# Patient Record
Sex: FEMALE | Race: WHITE | NOT HISPANIC OR LATINO | URBAN - METROPOLITAN AREA
[De-identification: names, ages, dates, MRNs, and addresses within clinical notes are randomized per-mention and may not be internally consistent; named-entity substitution may affect disease eponyms.]

---

## 2021-01-15 ENCOUNTER — IMMUNIZATION (OUTPATIENT)
Dept: INTERNAL MEDICINE | Facility: CLINIC | Age: 76
End: 2021-01-15
Payer: MEDICARE

## 2021-01-15 DIAGNOSIS — Z23 NEED FOR VACCINATION: Primary | ICD-10-CM

## 2021-01-15 PROCEDURE — 91300 COVID-19, MRNA, LNP-S, PF, 30 MCG/0.3 ML DOSE VACCINE: CPT | Mod: PBBFAC | Performed by: INTERNAL MEDICINE

## 2021-02-05 ENCOUNTER — IMMUNIZATION (OUTPATIENT)
Dept: INTERNAL MEDICINE | Facility: CLINIC | Age: 76
End: 2021-02-05
Payer: MEDICARE

## 2021-02-05 DIAGNOSIS — Z23 NEED FOR VACCINATION: Primary | ICD-10-CM

## 2021-02-05 PROCEDURE — 0002A COVID-19, MRNA, LNP-S, PF, 30 MCG/0.3 ML DOSE VACCINE: CPT | Mod: PBBFAC | Performed by: INTERNAL MEDICINE

## 2021-02-05 PROCEDURE — 91300 COVID-19, MRNA, LNP-S, PF, 30 MCG/0.3 ML DOSE VACCINE: CPT | Mod: PBBFAC | Performed by: INTERNAL MEDICINE

## 2021-03-18 ENCOUNTER — PATIENT MESSAGE (OUTPATIENT)
Dept: RESEARCH | Facility: HOSPITAL | Age: 76
End: 2021-03-18

## 2021-03-26 ENCOUNTER — PATIENT MESSAGE (OUTPATIENT)
Dept: RESEARCH | Facility: HOSPITAL | Age: 76
End: 2021-03-26

## 2024-07-24 ENCOUNTER — HOSPITAL ENCOUNTER (INPATIENT)
Facility: OTHER | Age: 79
LOS: 1 days | Discharge: HOME | DRG: 057 | End: 2024-07-26
Attending: EMERGENCY MEDICINE | Admitting: HOSPITALIST
Payer: COMMERCIAL

## 2024-07-24 DIAGNOSIS — G20.A1 PARKINSON'S DISEASE, UNSPECIFIED WHETHER DYSKINESIA PRESENT, UNSPECIFIED WHETHER MANIFESTATIONS FLUCTUATE: ICD-10-CM

## 2024-07-24 DIAGNOSIS — E86.0 DEHYDRATION: ICD-10-CM

## 2024-07-24 DIAGNOSIS — R00.0 TACHYCARDIA: ICD-10-CM

## 2024-07-24 DIAGNOSIS — Z91.81 HISTORY OF RECENT FALL: ICD-10-CM

## 2024-07-24 DIAGNOSIS — R41.82 ALTERED MENTAL STATUS, UNSPECIFIED ALTERED MENTAL STATUS TYPE: Primary | ICD-10-CM

## 2024-07-24 DIAGNOSIS — R07.9 CHEST PAIN: ICD-10-CM

## 2024-07-24 DIAGNOSIS — S22.080S COMPRESSION FRACTURE OF T12 VERTEBRA, SEQUELA: ICD-10-CM

## 2024-07-24 DIAGNOSIS — F03.918 DEMENTIA WITH BEHAVIORAL DISTURBANCE: ICD-10-CM

## 2024-07-24 DIAGNOSIS — Z86.69 HISTORY OF PARKINSON'S DISEASE: ICD-10-CM

## 2024-07-24 PROBLEM — F33.9 RECURRENT MAJOR DEPRESSIVE DISORDER: Status: ACTIVE | Noted: 2024-07-24

## 2024-07-24 PROBLEM — I10 PRIMARY HYPERTENSION: Status: ACTIVE | Noted: 2024-07-24

## 2024-07-24 LAB
ALBUMIN SERPL BCP-MCNC: 3.6 G/DL (ref 3.5–5.2)
ALP SERPL-CCNC: 58 U/L (ref 55–135)
ALT SERPL W/O P-5'-P-CCNC: 20 U/L (ref 10–44)
ANION GAP SERPL CALC-SCNC: 13 MMOL/L (ref 8–16)
AST SERPL-CCNC: 26 U/L (ref 10–40)
BACTERIA #/AREA URNS HPF: NORMAL /HPF
BASOPHILS # BLD AUTO: 0.02 K/UL (ref 0–0.2)
BASOPHILS NFR BLD: 0.4 % (ref 0–1.9)
BILIRUB SERPL-MCNC: 0.5 MG/DL (ref 0.1–1)
BILIRUB UR QL STRIP: NEGATIVE
BUN SERPL-MCNC: 28 MG/DL (ref 8–23)
CALCIUM SERPL-MCNC: 9.4 MG/DL (ref 8.7–10.5)
CHLORIDE SERPL-SCNC: 109 MMOL/L (ref 95–110)
CLARITY UR: CLEAR
CO2 SERPL-SCNC: 19 MMOL/L (ref 23–29)
COLOR UR: YELLOW
CREAT SERPL-MCNC: 1.4 MG/DL (ref 0.5–1.4)
CTP QC/QA: YES
DIFFERENTIAL METHOD BLD: ABNORMAL
EOSINOPHIL # BLD AUTO: 0.1 K/UL (ref 0–0.5)
EOSINOPHIL NFR BLD: 1.7 % (ref 0–8)
ERYTHROCYTE [DISTWIDTH] IN BLOOD BY AUTOMATED COUNT: 13.3 % (ref 11.5–14.5)
EST. GFR  (NO RACE VARIABLE): 39 ML/MIN/1.73 M^2
GLUCOSE SERPL-MCNC: 111 MG/DL (ref 70–110)
GLUCOSE UR QL STRIP: ABNORMAL
HCT VFR BLD AUTO: 34 % (ref 37–48.5)
HCV AB SERPL QL IA: NEGATIVE
HGB BLD-MCNC: 11.4 G/DL (ref 12–16)
HGB UR QL STRIP: NEGATIVE
HIV 1+2 AB+HIV1 P24 AG SERPL QL IA: NEGATIVE
HYALINE CASTS #/AREA URNS LPF: 0 /LPF
IMM GRANULOCYTES # BLD AUTO: 0.01 K/UL (ref 0–0.04)
IMM GRANULOCYTES NFR BLD AUTO: 0.2 % (ref 0–0.5)
KETONES UR QL STRIP: NEGATIVE
LEUKOCYTE ESTERASE UR QL STRIP: NEGATIVE
LYMPHOCYTES # BLD AUTO: 1.3 K/UL (ref 1–4.8)
LYMPHOCYTES NFR BLD: 24.5 % (ref 18–48)
MCH RBC QN AUTO: 31.3 PG (ref 27–31)
MCHC RBC AUTO-ENTMCNC: 33.5 G/DL (ref 32–36)
MCV RBC AUTO: 93 FL (ref 82–98)
MICROSCOPIC COMMENT: NORMAL
MONOCYTES # BLD AUTO: 0.4 K/UL (ref 0.3–1)
MONOCYTES NFR BLD: 7.8 % (ref 4–15)
NEUTROPHILS # BLD AUTO: 3.4 K/UL (ref 1.8–7.7)
NEUTROPHILS NFR BLD: 65.4 % (ref 38–73)
NITRITE UR QL STRIP: NEGATIVE
NRBC BLD-RTO: 0 /100 WBC
PH UR STRIP: 6 [PH] (ref 5–8)
PLATELET # BLD AUTO: 164 K/UL (ref 150–450)
PMV BLD AUTO: 9.9 FL (ref 9.2–12.9)
POTASSIUM SERPL-SCNC: 4.3 MMOL/L (ref 3.5–5.1)
PROT SERPL-MCNC: 6.1 G/DL (ref 6–8.4)
PROT UR QL STRIP: ABNORMAL
RBC # BLD AUTO: 3.64 M/UL (ref 4–5.4)
RBC #/AREA URNS HPF: 1 /HPF (ref 0–4)
SARS-COV-2 RDRP RESP QL NAA+PROBE: NEGATIVE
SODIUM SERPL-SCNC: 141 MMOL/L (ref 136–145)
SP GR UR STRIP: 1.01 (ref 1–1.03)
SQUAMOUS #/AREA URNS HPF: 1 /HPF
TROPONIN I SERPL DL<=0.01 NG/ML-MCNC: 0.01 NG/ML (ref 0–0.03)
URN SPEC COLLECT METH UR: ABNORMAL
UROBILINOGEN UR STRIP-ACNC: NEGATIVE EU/DL
WBC # BLD AUTO: 5.23 K/UL (ref 3.9–12.7)
WBC #/AREA URNS HPF: 1 /HPF (ref 0–5)

## 2024-07-24 PROCEDURE — 25000003 PHARM REV CODE 250: Performed by: NURSE PRACTITIONER

## 2024-07-24 PROCEDURE — 87635 SARS-COV-2 COVID-19 AMP PRB: CPT | Performed by: EMERGENCY MEDICINE

## 2024-07-24 PROCEDURE — 96361 HYDRATE IV INFUSION ADD-ON: CPT

## 2024-07-24 PROCEDURE — 84484 ASSAY OF TROPONIN QUANT: CPT | Performed by: EMERGENCY MEDICINE

## 2024-07-24 PROCEDURE — 94761 N-INVAS EAR/PLS OXIMETRY MLT: CPT

## 2024-07-24 PROCEDURE — 86803 HEPATITIS C AB TEST: CPT | Performed by: EMERGENCY MEDICINE

## 2024-07-24 PROCEDURE — G0378 HOSPITAL OBSERVATION PER HR: HCPCS

## 2024-07-24 PROCEDURE — 81000 URINALYSIS NONAUTO W/SCOPE: CPT | Performed by: EMERGENCY MEDICINE

## 2024-07-24 PROCEDURE — 80053 COMPREHEN METABOLIC PANEL: CPT | Performed by: EMERGENCY MEDICINE

## 2024-07-24 PROCEDURE — 85025 COMPLETE CBC W/AUTO DIFF WBC: CPT | Performed by: EMERGENCY MEDICINE

## 2024-07-24 PROCEDURE — 63600175 PHARM REV CODE 636 W HCPCS: Performed by: NURSE PRACTITIONER

## 2024-07-24 PROCEDURE — 96372 THER/PROPH/DIAG INJ SC/IM: CPT | Performed by: NURSE PRACTITIONER

## 2024-07-24 PROCEDURE — P9612 CATHETERIZE FOR URINE SPEC: HCPCS

## 2024-07-24 PROCEDURE — 96374 THER/PROPH/DIAG INJ IV PUSH: CPT

## 2024-07-24 PROCEDURE — 25000003 PHARM REV CODE 250: Performed by: EMERGENCY MEDICINE

## 2024-07-24 PROCEDURE — 87389 HIV-1 AG W/HIV-1&-2 AB AG IA: CPT | Performed by: EMERGENCY MEDICINE

## 2024-07-24 PROCEDURE — 99285 EMERGENCY DEPT VISIT HI MDM: CPT | Mod: 25

## 2024-07-24 RX ORDER — BACLOFEN 10 MG/1
TABLET ORAL
COMMUNITY

## 2024-07-24 RX ORDER — AMOXICILLIN 250 MG
1 CAPSULE ORAL 2 TIMES DAILY
Status: DISCONTINUED | OUTPATIENT
Start: 2024-07-24 | End: 2024-07-26 | Stop reason: HOSPADM

## 2024-07-24 RX ORDER — ACETAMINOPHEN 325 MG/1
650 TABLET ORAL EVERY 8 HOURS PRN
Status: DISCONTINUED | OUTPATIENT
Start: 2024-07-24 | End: 2024-07-26 | Stop reason: HOSPADM

## 2024-07-24 RX ORDER — ONDANSETRON HYDROCHLORIDE 2 MG/ML
4 INJECTION, SOLUTION INTRAVENOUS EVERY 8 HOURS PRN
Status: DISCONTINUED | OUTPATIENT
Start: 2024-07-24 | End: 2024-07-24

## 2024-07-24 RX ORDER — IPRATROPIUM BROMIDE AND ALBUTEROL SULFATE 2.5; .5 MG/3ML; MG/3ML
3 SOLUTION RESPIRATORY (INHALATION) EVERY 6 HOURS PRN
Status: DISCONTINUED | OUTPATIENT
Start: 2024-07-24 | End: 2024-07-24

## 2024-07-24 RX ORDER — PROCHLORPERAZINE EDISYLATE 5 MG/ML
5 INJECTION INTRAMUSCULAR; INTRAVENOUS EVERY 6 HOURS PRN
Status: DISCONTINUED | OUTPATIENT
Start: 2024-07-24 | End: 2024-07-24

## 2024-07-24 RX ORDER — LISINOPRIL 10 MG/1
10 TABLET ORAL DAILY
Status: DISCONTINUED | OUTPATIENT
Start: 2024-07-24 | End: 2024-07-26 | Stop reason: HOSPADM

## 2024-07-24 RX ORDER — HYDRALAZINE HYDROCHLORIDE 20 MG/ML
10 INJECTION INTRAMUSCULAR; INTRAVENOUS ONCE
Status: COMPLETED | OUTPATIENT
Start: 2024-07-24 | End: 2024-07-24

## 2024-07-24 RX ORDER — SODIUM CHLORIDE 9 MG/ML
INJECTION, SOLUTION INTRAVENOUS ONCE
Status: COMPLETED | OUTPATIENT
Start: 2024-07-24 | End: 2024-07-25

## 2024-07-24 RX ORDER — TALC
6 POWDER (GRAM) TOPICAL NIGHTLY PRN
Status: DISCONTINUED | OUTPATIENT
Start: 2024-07-24 | End: 2024-07-26 | Stop reason: HOSPADM

## 2024-07-24 RX ORDER — CARBIDOPA AND LEVODOPA 25; 100 MG/1; MG/1
TABLET, ORALLY DISINTEGRATING ORAL
COMMUNITY

## 2024-07-24 RX ORDER — TALC
6 POWDER (GRAM) TOPICAL NIGHTLY PRN
Status: DISCONTINUED | OUTPATIENT
Start: 2024-07-24 | End: 2024-07-24

## 2024-07-24 RX ORDER — FLUOXETINE HYDROCHLORIDE 20 MG/1
40 CAPSULE ORAL DAILY
Status: DISCONTINUED | OUTPATIENT
Start: 2024-07-25 | End: 2024-07-26 | Stop reason: HOSPADM

## 2024-07-24 RX ORDER — ONDANSETRON HYDROCHLORIDE 2 MG/ML
4 INJECTION, SOLUTION INTRAVENOUS EVERY 6 HOURS PRN
Status: DISCONTINUED | OUTPATIENT
Start: 2024-07-24 | End: 2024-07-26 | Stop reason: HOSPADM

## 2024-07-24 RX ORDER — BACLOFEN 10 MG/1
10 TABLET ORAL 4 TIMES DAILY
Status: DISCONTINUED | OUTPATIENT
Start: 2024-07-24 | End: 2024-07-24

## 2024-07-24 RX ORDER — CHOLECALCIFEROL (VITAMIN D3) 25 MCG
5000 TABLET ORAL DAILY
Status: DISCONTINUED | OUTPATIENT
Start: 2024-07-25 | End: 2024-07-26 | Stop reason: HOSPADM

## 2024-07-24 RX ORDER — SIMETHICONE 80 MG
1 TABLET,CHEWABLE ORAL 4 TIMES DAILY PRN
Status: DISCONTINUED | OUTPATIENT
Start: 2024-07-24 | End: 2024-07-24

## 2024-07-24 RX ORDER — NALOXONE HCL 0.4 MG/ML
0.02 VIAL (ML) INJECTION
Status: DISCONTINUED | OUTPATIENT
Start: 2024-07-24 | End: 2024-07-26 | Stop reason: HOSPADM

## 2024-07-24 RX ORDER — GLUCAGON 1 MG
1 KIT INJECTION
Status: DISCONTINUED | OUTPATIENT
Start: 2024-07-24 | End: 2024-07-24

## 2024-07-24 RX ORDER — POTASSIUM CHLORIDE 1.5 G/1.58G
POWDER, FOR SOLUTION ORAL
COMMUNITY

## 2024-07-24 RX ORDER — SODIUM CHLORIDE 0.9 % (FLUSH) 0.9 %
10 SYRINGE (ML) INJECTION
Status: DISCONTINUED | OUTPATIENT
Start: 2024-07-24 | End: 2024-07-24

## 2024-07-24 RX ORDER — ACETAMINOPHEN 500 MG
TABLET ORAL
COMMUNITY

## 2024-07-24 RX ORDER — ALUMINUM HYDROXIDE, MAGNESIUM HYDROXIDE, AND SIMETHICONE 1200; 120; 1200 MG/30ML; MG/30ML; MG/30ML
30 SUSPENSION ORAL 4 TIMES DAILY PRN
Status: DISCONTINUED | OUTPATIENT
Start: 2024-07-24 | End: 2024-07-24

## 2024-07-24 RX ORDER — SODIUM CHLORIDE 9 MG/ML
INJECTION, SOLUTION INTRAVENOUS ONCE
Status: DISCONTINUED | OUTPATIENT
Start: 2024-07-24 | End: 2024-07-24

## 2024-07-24 RX ORDER — ENOXAPARIN SODIUM 100 MG/ML
30 INJECTION SUBCUTANEOUS EVERY 24 HOURS
Status: DISCONTINUED | OUTPATIENT
Start: 2024-07-24 | End: 2024-07-26 | Stop reason: HOSPADM

## 2024-07-24 RX ORDER — SODIUM CHLORIDE 0.9 % (FLUSH) 0.9 %
10 SYRINGE (ML) INJECTION EVERY 8 HOURS PRN
Status: DISCONTINUED | OUTPATIENT
Start: 2024-07-24 | End: 2024-07-26 | Stop reason: HOSPADM

## 2024-07-24 RX ORDER — FLUOXETINE HYDROCHLORIDE 40 MG/1
CAPSULE ORAL
COMMUNITY

## 2024-07-24 RX ORDER — IBUPROFEN 200 MG
24 TABLET ORAL
Status: DISCONTINUED | OUTPATIENT
Start: 2024-07-24 | End: 2024-07-24

## 2024-07-24 RX ORDER — IBUPROFEN 200 MG
16 TABLET ORAL
Status: DISCONTINUED | OUTPATIENT
Start: 2024-07-24 | End: 2024-07-24

## 2024-07-24 RX ORDER — ACETAMINOPHEN 650 MG/1
650 SUPPOSITORY RECTAL ONCE
Status: COMPLETED | OUTPATIENT
Start: 2024-07-24 | End: 2024-07-24

## 2024-07-24 RX ORDER — OXYCODONE AND ACETAMINOPHEN 10; 325 MG/1; MG/1
TABLET ORAL
Status: ON HOLD | COMMUNITY
Start: 2024-07-05 | End: 2024-07-26

## 2024-07-24 RX ORDER — LISINOPRIL 10 MG/1
1 TABLET ORAL DAILY
COMMUNITY

## 2024-07-24 RX ORDER — CARBIDOPA AND LEVODOPA 25; 100 MG/1; MG/1
2 TABLET ORAL 2 TIMES DAILY
Status: DISCONTINUED | OUTPATIENT
Start: 2024-07-24 | End: 2024-07-26 | Stop reason: HOSPADM

## 2024-07-24 RX ADMIN — ENOXAPARIN SODIUM 30 MG: 30 INJECTION SUBCUTANEOUS at 05:07

## 2024-07-24 RX ADMIN — SODIUM CHLORIDE 500 ML: 9 INJECTION, SOLUTION INTRAVENOUS at 02:07

## 2024-07-24 RX ADMIN — SODIUM CHLORIDE: 9 INJECTION, SOLUTION INTRAVENOUS at 05:07

## 2024-07-24 RX ADMIN — ACETAMINOPHEN 650 MG: 650 SUPPOSITORY RECTAL at 03:07

## 2024-07-24 RX ADMIN — HYDRALAZINE HYDROCHLORIDE 10 MG: 20 INJECTION INTRAMUSCULAR; INTRAVENOUS at 03:07

## 2024-07-24 NOTE — HPI
Tiffanie Naylor is a 79 year old female with a past medical history of depression, Parkinson's, chronic pain, atrial flutter, osteoarthritis and hypertension who presents with decreased mental status.  Patient currently resides at [facility] nursing home and staff informed paramedics patient had an unwitnessed fall last night.  Nurse today was unable to state how long patient was down or exactly what occurred.  Per ED note, nursing home staff reports patient is alert and oriented at baseline, but was unresponsive today.  Daughter present at bedside, states patient was recently placed at [facility] after being transferred from another nursing home in [place].  Patient's daughter states she has exhibited similar symptoms in the past when she had a UTI.  Daughter also reports patient has had a decreased appetite since yesterday.  History limited given current mental status change and information obtained per chart review and patient's daughter.    CBC and CMP unremarkable.  UA negative for UTI.  CT C-spine showed no acute process.  CT head showed no acute process.  Per nursing home record, patient did not take her medications today.  Patient found to be hypertensive and tachycardic in the ED. EKG showed sinus tachycardia. ED provider spoke to tele neurology on-call who agreed to consult.  Patient received 500 cc bolus and was referred to Hospital Medicine.  Patient will be placed in observation for further evaluation and management.

## 2024-07-24 NOTE — ED NOTES
Pt became diaphoretic, tachycardic and hypertensive. Pt refusing anything PO, providered ordered IV anti-hypertensive and rectal tylenol for pain.

## 2024-07-24 NOTE — SUBJECTIVE & OBJECTIVE
Past Medical History:   Diagnosis Date    Atrial flutter     Hypertension     Impaired gait and mobility     Parkinson's disease        Past Surgical History:   Procedure Laterality Date    BACK SURGERY         Review of patient's allergies indicates:   Allergen Reactions    Gabapentin Other (See Comments)    Guaifenesin Other (See Comments)    Codeine Nausea And Vomiting, Hives and Other (See Comments)       No current facility-administered medications on file prior to encounter.     Current Outpatient Medications on File Prior to Encounter   Medication Sig    baclofen (LIORESAL) 10 MG tablet Take 1 tablet 4 times a day by oral route.    carbamide peroxide (DEBROX) 6.5 % otic solution daily prn    carbidopa-levodopa (PARCOPA)  mg per disintegrating tablet Place 2 tablets twice a day by translingual route.    cholecalciferol, vitamin D3, (VITAMIN D3) 125 mcg (5,000 unit) Tab     FLUoxetine 40 MG capsule Take 1 capsule every day by oral route.    lisinopriL 10 MG tablet Take 1 tablet by mouth once daily.    multivit with minerals/lutein (MULTIVITAMIN 50 PLUS ORAL)     oxyCODONE-acetaminophen (PERCOCET)  mg per tablet Take 1 tablet every 6 hours by oral route as needed.    potassium chloride (KLOR-CON) 20 mEq Pack Take 1 packet twice a day by oral route.     Family History    None       Tobacco Use    Smoking status: Unknown    Smokeless tobacco: Not on file   Substance and Sexual Activity    Alcohol use: Never    Drug use: Never    Sexual activity: Not on file     Review of Systems   Unable to perform ROS: Mental status change     Objective:     Vital Signs (Most Recent):  Temp: 97.7 °F (36.5 °C) (07/24/24 1714)  Pulse: (!) 125 (07/24/24 1728)  Resp: 18 (07/24/24 1714)  BP: (!) 140/73 (07/24/24 1714)  SpO2: 95 % (07/24/24 1714) Vital Signs (24h Range):  Temp:  [97.7 °F (36.5 °C)-98.9 °F (37.2 °C)] 97.7 °F (36.5 °C)  Pulse:  [101-140] 125  Resp:  [12-18] 18  SpO2:  [95 %-99 %] 95 %  BP: (140-218)/()  140/73     Weight: 32.8 kg (72 lb 5 oz)  Body mass index is 12.81 kg/m².     Physical Exam  Vitals reviewed.   Constitutional:       Appearance: She is underweight. She is ill-appearing (chronically).   HENT:      Head: Normocephalic.      Mouth/Throat:      Mouth: Mucous membranes are dry.      Pharynx: Oropharynx is clear.   Eyes:      General: Lids are normal. Gaze aligned appropriately.      Conjunctiva/sclera: Conjunctivae normal.   Cardiovascular:      Rate and Rhythm: Regular rhythm. Tachycardia present.      Pulses: Normal pulses.      Heart sounds: Normal heart sounds.   Pulmonary:      Effort: Pulmonary effort is normal.      Breath sounds: Normal breath sounds.   Abdominal:      General: Abdomen is flat. Bowel sounds are normal.      Palpations: Abdomen is soft.   Musculoskeletal:         General: Normal range of motion.      Cervical back: Normal range of motion.      Right lower leg: No edema.      Left lower leg: No edema.   Skin:     General: Skin is warm and dry.      Findings: Bruising (old bruising noted to lower extremities) present.   Neurological:      Mental Status: She is alert and oriented to person, place, and time. Mental status is at baseline.   Psychiatric:         Mood and Affect: Mood normal.                Significant Labs: All pertinent labs within the past 24 hours have been reviewed.  CBC:   Recent Labs   Lab 07/24/24  1341   WBC 5.23   HGB 11.4*   HCT 34.0*        CMP:   Recent Labs   Lab 07/24/24  1341      K 4.3      CO2 19*   *   BUN 28*   CREATININE 1.4   CALCIUM 9.4   PROT 6.1   ALBUMIN 3.6   BILITOT 0.5   ALKPHOS 58   AST 26   ALT 20   ANIONGAP 13       Significant Imaging: I have reviewed all pertinent imaging results/findings within the past 24 hours.  Imaging Results              CT Cervical Spine Without Contrast (Final result)  Result time 07/24/24 14:15:39      Final result by Jesus Alberto Klein III, MD (07/24/24 14:15:39)                    Impression:      No acute process seen.    DJD and bilateral neural foraminal narrowing at C5-C6 and C6-C7.    Left thyroid nodule.      Electronically signed by: Jesus Alberto Klein MD  Date:    07/24/2024  Time:    14:15               Narrative:    EXAMINATION:  CT CERVICAL SPINE WITHOUT CONTRAST    CLINICAL HISTORY:  Neck trauma (Age >= 65y);    FINDINGS:  Odontoid, prevertebral soft tissues, and posterior elements are intact.  There is mild DJD.  The facets are well aligned.  No skull base fracture seen.  No C-spine fracture seen.  Upper lungs are clear.  There is DJD.  No incidental masses or adenopathy seen.  There is a left thyroid nodule.  At C5-C6 there is bilateral neural foraminal narrowing.    At C6-C7 there is bilateral neural foraminal narrowing.                                       CT Head Without Contrast (Final result)  Result time 07/24/24 14:11:04      Final result by Jesus Alberto Klein III, MD (07/24/24 14:11:04)                   Impression:      No acute process seen.      Electronically signed by: Jesus Alberto Klein MD  Date:    07/24/2024  Time:    14:11               Narrative:    EXAMINATION:  CT HEAD WITHOUT CONTRAST    CLINICAL HISTORY:  Head trauma, minor (Age >= 65y);    FINDINGS:  There is involutional change and small vessel ischemic change.  No bleed, mass, or mass effect seen.  No skull lesion or skull fracture seen.  Sinuses, mastoid air cells, and middle ear cavities are clear.

## 2024-07-24 NOTE — NURSING
Patient received in room 324 from ED. Patient is unresponsive and responsive to painful stimuli (on raising the head of the bed). Vitals stable in room air, except tachycardiac.patient placed on telemetry and purewick. She is contracted but is  cooperative to some mobility like relaxing the hands and rolling to the sides. Patient's daughter at the bedside. Swallow test still due as patient is unresponsive.

## 2024-07-24 NOTE — ED NOTES
Provider notified of new BP and HR and has set new parameters to follow. Pt still refusing anything by mouth, attempted to complete a [place] and pt refused. Provider notified.

## 2024-07-24 NOTE — ED PROVIDER NOTES
"Encounter Date: 7/24/2024       History     Chief Complaint   Patient presents with    Altered Mental Status     Patient sent from [facility]. Per nursing staff patient had unwitnessed fall yesterday and is unresponsive today. Per EMS initial GCS 8. Patient DNR/DNI     79-year-old female presents from a nursing home with reported decreased mental status.  Nursing home staff told paramedics that she had an unwitnessed fall last night, but the nurse today was unable to state how long she was down or exactly what occurred.  Nursing home staff told paramedics that she is normally "A&O x4" but was "unresponsive" today.  Nursing home staff told paramedics that they suspect she may have a UTI.    The history is provided by the nursing home and the EMS personnel.     Review of patient's allergies indicates:  No Known Allergies  Past Medical History:   Diagnosis Date    Impaired gait and mobility     Parkinson's disease      Past Surgical History:   Procedure Laterality Date    BACK SURGERY       No family history on file.  Social History     Tobacco Use    Smoking status: Unknown   Substance Use Topics    Alcohol use: Never    Drug use: Never     Review of Systems   Unable to perform ROS: Mental status change       Physical Exam     Initial Vitals [07/24/24 1322]   BP Pulse Resp Temp SpO2   (!) 152/86 101 12 98.9 °F (37.2 °C) 98 %      MAP       --         Physical Exam    Constitutional: She appears well-developed. She appears lethargic. No distress.   HENT:   Head: Normocephalic and atraumatic.   Mouth/Throat: Oropharynx is clear and moist. No oropharyngeal exudate.   Eyes: Conjunctivae are normal. Pupils are equal, round, and reactive to light. Right eye exhibits no discharge. Left eye exhibits no discharge.   Cardiovascular:  Normal rate and regular rhythm.           No murmur heard.  Pulmonary/Chest: Breath sounds normal. No respiratory distress. She has no wheezes. She has no rales.   Abdominal: " Abdomen is soft. There is no abdominal tenderness. There is no rebound and no guarding.   Musculoskeletal:         General: No tenderness or edema.     Neurological: She appears lethargic. GCS eye subscore is 3. GCS verbal subscore is 1. GCS motor subscore is 4.   Skin: Skin is warm and dry.   Old-appearing bruises present to bilateral lower extremities.   Psychiatric: She is noncommunicative.       ED Course   Procedures  Labs Reviewed   CBC W/ AUTO DIFFERENTIAL - Abnormal       Result Value    WBC 5.23      RBC 3.64 (*)     Hemoglobin 11.4 (*)     Hematocrit 34.0 (*)     MCV 93      MCH 31.3 (*)     MCHC 33.5      RDW 13.3      Platelets 164      MPV 9.9      Immature Granulocytes 0.2      Gran # (ANC) 3.4      Immature Grans (Abs) 0.01      Lymph # 1.3      Mono # 0.4      Eos # 0.1      Baso # 0.02      nRBC 0      Gran % 65.4      Lymph % 24.5      Mono % 7.8      Eosinophil % 1.7      Basophil % 0.4      Differential Method Automated     COMPREHENSIVE METABOLIC PANEL - Abnormal    Sodium 141      Potassium 4.3      Chloride 109      CO2 19 (*)     Glucose 111 (*)     BUN 28 (*)     Creatinine 1.4      Calcium 9.4      Total Protein 6.1      Albumin 3.6      Total Bilirubin 0.5      Alkaline Phosphatase 58      AST 26      ALT 20      eGFR 39 (*)     Anion Gap 13     URINALYSIS, REFLEX TO URINE CULTURE - Abnormal    Specimen UA Urine, Catheterized      Color, UA Yellow      Appearance, UA Clear      pH, UA 6.0      Specific Gravity, UA 1.015      Protein, UA 1+ (*)     Glucose, UA Trace (*)     Ketones, UA Negative      Bilirubin (UA) Negative      Occult Blood UA Negative      Nitrite, UA Negative      Urobilinogen, UA Negative      Leukocytes, UA Negative      Narrative:     Specimen Source->Urine   TROPONIN I    Troponin I 0.008     HIV 1 / 2 ANTIBODY    HIV 1/2 Ag/Ab Negative      Narrative:     Release to patient->Immediate   HEPATITIS C ANTIBODY    Hepatitis C Ab Negative      Narrative:     Release to  patient->Immediate   URINALYSIS MICROSCOPIC    RBC, UA 1      WBC, UA 1      Bacteria Occasional      Squam Epithel, UA 1      Hyaline Casts, UA 0      Microscopic Comment SEE COMMENT      Narrative:     Specimen Source->Urine   SARS-COV-2 RDRP GENE          Imaging Results              CT Cervical Spine Without Contrast (Final result)  Result time 07/24/24 14:15:39      Final result by Jesus Alberto Klein III, MD (07/24/24 14:15:39)                   Impression:      No acute process seen.    DJD and bilateral neural foraminal narrowing at C5-C6 and C6-C7.    Left thyroid nodule.      Electronically signed by: Jesus Alberto Klein MD  Date:    07/24/2024  Time:    14:15               Narrative:    EXAMINATION:  CT CERVICAL SPINE WITHOUT CONTRAST    CLINICAL HISTORY:  Neck trauma (Age >= 65y);    FINDINGS:  Odontoid, prevertebral soft tissues, and posterior elements are intact.  There is mild DJD.  The facets are well aligned.  No skull base fracture seen.  No C-spine fracture seen.  Upper lungs are clear.  There is DJD.  No incidental masses or adenopathy seen.  There is a left thyroid nodule.  At C5-C6 there is bilateral neural foraminal narrowing.    At C6-C7 there is bilateral neural foraminal narrowing.                                       CT Head Without Contrast (Final result)  Result time 07/24/24 14:11:04      Final result by Jesus Alberto Klein III, MD (07/24/24 14:11:04)                   Impression:      No acute process seen.      Electronically signed by: Jesus Alberto Klein MD  Date:    07/24/2024  Time:    14:11               Narrative:    EXAMINATION:  CT HEAD WITHOUT CONTRAST    CLINICAL HISTORY:  Head trauma, minor (Age >= 65y);    FINDINGS:  There is involutional change and small vessel ischemic change.  No bleed, mass, or mass effect seen.  No skull lesion or skull fracture seen.  Sinuses, mastoid air cells, and middle ear cavities are clear.                                       Medications   sodium chloride  0.9% bolus 500 mL 500 mL (500 mLs Intravenous New Bag 7/24/24 1431)     Medical Decision Making  79-year-old female presents with altered mental status.  Vital signs are benign, afebrile.  Physical exam reveals a chronically ill appearing woman who withdrawals to pain, no verbal response.  Patient's daughter arrived to the bedside, states she has been at the nursing home only for 3 weeks and that for the last few days she has had increasing confusion, forgetting that she is unable to walk and trying to get out of bed, which likely led to her fall.  Patient became increasingly responsive, was able to answer questions.  Unclear if oxycodone use could be etiology.  No current evidence for infection.  There is mild dehydration, will treat with IV fluids.  I discussed the case with Neurology.  I discussed the case with the hospitalist.  She is placed in observation for further care and monitoring.    Amount and/or Complexity of Data Reviewed  External Data Reviewed: notes.     Details: Patient has records with her from nursing home, shows that she is DNR with comfort focused care preferred.  Labs: ordered. Decision-making details documented in ED Course.  Radiology: ordered.  Discussion of management or test interpretation with external provider(s): I discussed the case with hospitalist MD.  I discussed the case with Neurology MD.    Risk  Decision regarding hospitalization.  Decision not to resuscitate or to de-escalate care because of poor prognosis.               ED Course as of 07/24/24 1508   Wed Jul 24, 2024   1448 Troponin I  Reviewed and negative. [AK]   1448 Urinalysis, Reflex to Urine Culture Urine, Catheterized(!)  Reviewed.  1+ proteinuria, trace glucose present.  No convincing evidence of UTI. [AK]   1448 Comprehensive metabolic panel(!)  Reviewed.  Mild dehydration with BUN to creatinine ratio equal to 20.  No major electrolyte disturbance.  Normal LFTs.  No anion gap. [AK]   1449 CBC auto  differential(!)  Reviewed.  No leukocytosis.  Mild anemia.  Normal platelet count. [AK]   1449 Urinalysis Microscopic  Reviewed and benign.  No convincing evidence for UTI. [AK]   1449 I discussed the case with utilization management.  She meets observation criteria.  I sent message to the hospitalist, Dr. Lake. [AK]   1501 HIV 1/2 Ag/Ab (4th Gen)  Reviewed and negative. [AK]   1503 Hepatitis C Antibody  Reviewed and negative. [AK]      ED Course User Index  [AK] Angella Jo MD                           Clinical Impression:  Final diagnoses:  [R41.82] Altered mental status, unspecified altered mental status type (Primary)  [E86.0] Dehydration  [Z91.81] History of recent fall  [Z86.69] History of Parkinson's disease          ED Disposition Condition    Observation Stable                Angella Jo MD  07/24/24 6110

## 2024-07-25 PROBLEM — Z51.5 ENCOUNTER FOR PALLIATIVE CARE: Status: ACTIVE | Noted: 2024-07-25

## 2024-07-25 LAB
ALBUMIN SERPL BCP-MCNC: 3.2 G/DL (ref 3.5–5.2)
ALP SERPL-CCNC: 51 U/L (ref 55–135)
ALT SERPL W/O P-5'-P-CCNC: 22 U/L (ref 10–44)
AMPHET+METHAMPHET UR QL: NEGATIVE
ANION GAP SERPL CALC-SCNC: 10 MMOL/L (ref 8–16)
AST SERPL-CCNC: 26 U/L (ref 10–40)
BARBITURATES UR QL SCN>200 NG/ML: NEGATIVE
BASOPHILS # BLD AUTO: 0.02 K/UL (ref 0–0.2)
BASOPHILS NFR BLD: 0.5 % (ref 0–1.9)
BENZODIAZ UR QL SCN>200 NG/ML: NEGATIVE
BILIRUB SERPL-MCNC: 0.5 MG/DL (ref 0.1–1)
BUN SERPL-MCNC: 26 MG/DL (ref 8–23)
BZE UR QL SCN: NEGATIVE
CALCIUM SERPL-MCNC: 8.9 MG/DL (ref 8.7–10.5)
CANNABINOIDS UR QL SCN: NEGATIVE
CHLORIDE SERPL-SCNC: 113 MMOL/L (ref 95–110)
CO2 SERPL-SCNC: 20 MMOL/L (ref 23–29)
CREAT SERPL-MCNC: 1.1 MG/DL (ref 0.5–1.4)
CREAT UR-MCNC: 97.1 MG/DL (ref 15–325)
DIFFERENTIAL METHOD BLD: ABNORMAL
EOSINOPHIL # BLD AUTO: 0.1 K/UL (ref 0–0.5)
EOSINOPHIL NFR BLD: 3.3 % (ref 0–8)
ERYTHROCYTE [DISTWIDTH] IN BLOOD BY AUTOMATED COUNT: 13.5 % (ref 11.5–14.5)
EST. GFR  (NO RACE VARIABLE): 51 ML/MIN/1.73 M^2
ETHANOL UR-MCNC: <10 MG/DL
GLUCOSE SERPL-MCNC: 88 MG/DL (ref 70–110)
HCT VFR BLD AUTO: 34.1 % (ref 37–48.5)
HGB BLD-MCNC: 11 G/DL (ref 12–16)
IMM GRANULOCYTES # BLD AUTO: 0.02 K/UL (ref 0–0.04)
IMM GRANULOCYTES NFR BLD AUTO: 0.5 % (ref 0–0.5)
LYMPHOCYTES # BLD AUTO: 1.4 K/UL (ref 1–4.8)
LYMPHOCYTES NFR BLD: 35.3 % (ref 18–48)
MCH RBC QN AUTO: 30.8 PG (ref 27–31)
MCHC RBC AUTO-ENTMCNC: 32.3 G/DL (ref 32–36)
MCV RBC AUTO: 96 FL (ref 82–98)
METHADONE UR QL SCN>300 NG/ML: NEGATIVE
MONOCYTES # BLD AUTO: 0.4 K/UL (ref 0.3–1)
MONOCYTES NFR BLD: 9.8 % (ref 4–15)
NEUTROPHILS # BLD AUTO: 2 K/UL (ref 1.8–7.7)
NEUTROPHILS NFR BLD: 50.6 % (ref 38–73)
NRBC BLD-RTO: 0 /100 WBC
OHS QRS DURATION: 72 MS
OHS QTC CALCULATION: 427 MS
OPIATES UR QL SCN: NEGATIVE
PCP UR QL SCN>25 NG/ML: NEGATIVE
PLATELET # BLD AUTO: 150 K/UL (ref 150–450)
PMV BLD AUTO: 9.6 FL (ref 9.2–12.9)
POTASSIUM SERPL-SCNC: 3.6 MMOL/L (ref 3.5–5.1)
PROT SERPL-MCNC: 5.3 G/DL (ref 6–8.4)
RBC # BLD AUTO: 3.57 M/UL (ref 4–5.4)
SODIUM SERPL-SCNC: 143 MMOL/L (ref 136–145)
TOXICOLOGY INFORMATION: NORMAL
WBC # BLD AUTO: 3.97 K/UL (ref 3.9–12.7)

## 2024-07-25 PROCEDURE — 96361 HYDRATE IV INFUSION ADD-ON: CPT

## 2024-07-25 PROCEDURE — 80307 DRUG TEST PRSMV CHEM ANLYZR: CPT | Performed by: NURSE PRACTITIONER

## 2024-07-25 PROCEDURE — 25000003 PHARM REV CODE 250: Performed by: NURSE PRACTITIONER

## 2024-07-25 PROCEDURE — 85025 COMPLETE CBC W/AUTO DIFF WBC: CPT | Performed by: NURSE PRACTITIONER

## 2024-07-25 PROCEDURE — 63600175 PHARM REV CODE 636 W HCPCS: Performed by: NURSE PRACTITIONER

## 2024-07-25 PROCEDURE — 94761 N-INVAS EAR/PLS OXIMETRY MLT: CPT

## 2024-07-25 PROCEDURE — 80053 COMPREHEN METABOLIC PANEL: CPT | Performed by: NURSE PRACTITIONER

## 2024-07-25 PROCEDURE — G0425 INPT/ED TELECONSULT30: HCPCS | Mod: GT,,, | Performed by: STUDENT IN AN ORGANIZED HEALTH CARE EDUCATION/TRAINING PROGRAM

## 2024-07-25 PROCEDURE — 96375 TX/PRO/DX INJ NEW DRUG ADDON: CPT

## 2024-07-25 PROCEDURE — 99497 ADVNCD CARE PLAN 30 MIN: CPT | Mod: 25,,, | Performed by: FAMILY MEDICINE

## 2024-07-25 PROCEDURE — 21400001 HC TELEMETRY ROOM

## 2024-07-25 PROCEDURE — 99223 1ST HOSP IP/OBS HIGH 75: CPT | Mod: ,,, | Performed by: FAMILY MEDICINE

## 2024-07-25 RX ORDER — SODIUM CHLORIDE 9 MG/ML
INJECTION, SOLUTION INTRAVENOUS CONTINUOUS
Status: DISCONTINUED | OUTPATIENT
Start: 2024-07-25 | End: 2024-07-26

## 2024-07-25 RX ORDER — BACLOFEN 5 MG/1
5 TABLET ORAL 3 TIMES DAILY
Status: DISCONTINUED | OUTPATIENT
Start: 2024-07-25 | End: 2024-07-26 | Stop reason: HOSPADM

## 2024-07-25 RX ORDER — HYDROMORPHONE HYDROCHLORIDE 1 MG/ML
0.5 INJECTION, SOLUTION INTRAMUSCULAR; INTRAVENOUS; SUBCUTANEOUS EVERY 6 HOURS PRN
Status: DISCONTINUED | OUTPATIENT
Start: 2024-07-25 | End: 2024-07-26

## 2024-07-25 RX ORDER — LABETALOL HYDROCHLORIDE 5 MG/ML
10 INJECTION, SOLUTION INTRAVENOUS EVERY 8 HOURS PRN
Status: DISCONTINUED | OUTPATIENT
Start: 2024-07-25 | End: 2024-07-26 | Stop reason: HOSPADM

## 2024-07-25 RX ORDER — KETOROLAC TROMETHAMINE 30 MG/ML
10 INJECTION, SOLUTION INTRAMUSCULAR; INTRAVENOUS 2 TIMES DAILY PRN
Status: DISCONTINUED | OUTPATIENT
Start: 2024-07-25 | End: 2024-07-26 | Stop reason: HOSPADM

## 2024-07-25 RX ORDER — OXYCODONE AND ACETAMINOPHEN 10; 325 MG/1; MG/1
1 TABLET ORAL EVERY 6 HOURS PRN
Status: DISCONTINUED | OUTPATIENT
Start: 2024-07-25 | End: 2024-07-26

## 2024-07-25 RX ADMIN — OXYCODONE HYDROCHLORIDE AND ACETAMINOPHEN 1 TABLET: 10; 325 TABLET ORAL at 09:07

## 2024-07-25 RX ADMIN — CARBIDOPA AND LEVODOPA 2 TABLET: 25; 100 TABLET ORAL at 09:07

## 2024-07-25 RX ADMIN — HYDROMORPHONE HYDROCHLORIDE 0.5 MG: 1 INJECTION, SOLUTION INTRAMUSCULAR; INTRAVENOUS; SUBCUTANEOUS at 03:07

## 2024-07-25 RX ADMIN — LABETALOL HYDROCHLORIDE 10 MG: 5 INJECTION INTRAVENOUS at 08:07

## 2024-07-25 RX ADMIN — BACLOFEN 5 MG: 5 TABLET ORAL at 09:07

## 2024-07-25 RX ADMIN — SODIUM CHLORIDE: 9 INJECTION, SOLUTION INTRAVENOUS at 01:07

## 2024-07-25 RX ADMIN — DOCUSATE SODIUM AND SENNOSIDES 1 TABLET: 8.6; 5 TABLET, FILM COATED ORAL at 09:07

## 2024-07-25 RX ADMIN — HYDROMORPHONE HYDROCHLORIDE 0.5 MG: 1 INJECTION, SOLUTION INTRAMUSCULAR; INTRAVENOUS; SUBCUTANEOUS at 09:07

## 2024-07-25 NOTE — ASSESSMENT & PLAN NOTE
Chronic, controlled. Latest blood pressure and vitals reviewed-     Temp:  [96.6 °F (35.9 °C)-98.8 °F (37.1 °C)]   Pulse:  []   Resp:  [12-18]   BP: (136-206)/()   SpO2:  [94 %-98 %] .   Home meds for hypertension were reviewed and noted below.   Hypertension Medications               lisinopriL 10 MG tablet Take 1 tablet by mouth once daily.            While in the hospital, will manage blood pressure as follows; Continue home antihypertensive regimen    Will utilize p.r.n. blood pressure medication only if patient's blood pressure greater than 180/110 and she develops symptoms such as worsening chest pain or shortness of breath.

## 2024-07-25 NOTE — NURSING
"RAPID RESPONSE NURSE PROACTIVE ROUNDING NOTE     Time of Visit:     Admit Date: 2024  LOS: 0  Code Status: DNR   Date of Visit: 2024  : 1945  Age: 79 y.o.  Sex: female  Race: White  Bed: B324/B324 A:   MRN: [id number]  Was the patient discharged from an ICU this admission? No   Was the patient discharged from a PACU within last 24 hours?  No  Did the patient receive conscious sedation/general anesthesia in last 24 hours?  No  Was the patient in the ED within the past 24 hours?  Yes  Was the patient started on NIPPV within the past 24 hours?  No  Attending Physician: Nathan Snyder MD  Primary Service: Networked reference to record PCT     ASSESSMENT     Notified by Epic patient alert.  Reason for alert: MEWS score    Diagnosis: Altered mental status    Abnormal Vital Signs: BP (!) 136/100   Pulse 85   Temp 97.6 °F (36.4 °C)   Resp 12   Ht 5' 3" (1.6 m)   Wt 32.8 kg (72 lb 5 oz)   SpO2 97%   BMI 12.81 kg/m²      Clinical Issues: Neuro    Patient  has a past medical history of Atrial flutter, Hypertension, Impaired gait and mobility, and Parkinson's disease.      Pt lying in bed comfortably. Arouses to voice. States she's resting. Oriented to self. No reports of pain. No complaints at this time.     INTERVENTIONS/ RECOMMENDATIONS     No interventions at this time.    Discussed plan of care with Charge nurse.    PHYSICIAN ESCALATION     Yes/No  No    Orders received and case discussed with NA.    Disposition: Remain in room 324.    FOLLOW-UP     Call back the Rapid Response Nurse, Nisha Flores RN at [facility] Phone:[phone] for additional questions or concerns.         "

## 2024-07-25 NOTE — PLAN OF CARE
Problem: Adult Inpatient Plan of Care  Goal: Plan of Care Review  Outcome: Progressing  Patient had no adverse events during shift. Patient free of falls. Call light in reach. Side Rails x2. Bed alarm set, family at the bedside. Pain well controlled with ordered medications. Patient repositions X1 assist. IVF/Medication administered as ordered. VSS. Heart monitor in place. Chart reviewed.

## 2024-07-25 NOTE — HPI
"Per H&P: "HPI: Tiffanie Naylor is a 79 year old female with a past medical history of depression, Parkinson's, chronic pain, atrial flutter, osteoarthritis and hypertension who presents with decreased mental status.  Patient currently resides at [facility] nursing home and staff informed paramedics patient had an unwitnessed fall last night.  Nurse today was unable to state how long patient was down or exactly what occurred.  Per ED note, nursing home staff reports patient is alert and oriented at baseline, but was unresponsive today.  Daughter present at bedside, states patient was recently placed at [facility] after being transferred from another nursing home in [place].  Patient's daughter states she has exhibited similar symptoms in the past when she had a UTI.  Daughter also reports patient has had a decreased appetite since yesterday.  History limited given current mental status change and information obtained per chart review and patient's daughter.     CBC and CMP unremarkable.  UA negative for UTI.  CT C-spine showed no acute process.  CT head showed no acute process.  Per nursing home record, patient did not take her medications today.  Patient found to be hypertensive and tachycardic in the ED. EKG showed sinus tachycardia. ED provider spoke to tele neurology on-call who agreed to consult.  Patient received 500 cc bolus and was referred to Hospital Medicine.  Patient will be placed in observation for further evaluation and management."    At time of initial consult, patient seen lying in bed, she is chronically ill appearing and does not engage in conversation. Palliative medicine consulted for goals of care/ advance care planning.   "

## 2024-07-25 NOTE — H&P
[facility] Medicine  History & Physical    Patient Name: Tiffanie Naylor  MRN: [id number]  Patient Class: OP- Observation  Admission Date: 7/24/2024  Attending Physician: Nathan Snyder MD   Primary Care Provider: Licha, Primary Doctor         Patient information was obtained from patient, relative(s), nursing home, past medical records, and ER records.     Subjective:     Principal Problem:Altered mental status    Chief Complaint:   Chief Complaint   Patient presents with    Altered Mental Status     Patient sent from [facility]. Per nursing staff patient had unwitnessed fall yesterday and is unresponsive today. Per EMS initial GCS 8. Patient DNR/DNI        HPI: Tiffanie Naylor is a 79 year old female with a past medical history of depression, Parkinson's, chronic pain, atrial flutter, osteoarthritis and hypertension who presents with decreased mental status.  Patient currently resides at [facility] nursing home and staff informed paramedics patient had an unwitnessed fall last night.  Nurse today was unable to state how long patient was down or exactly what occurred.  Per ED note, nursing home staff reports patient is alert and oriented at baseline, but was unresponsive today.  Daughter present at bedside, states patient was recently placed at [facility] after being transferred from another nursing home in [place].  Patient's daughter states she has exhibited similar symptoms in the past when she had a UTI.  Daughter also reports patient has had a decreased appetite since yesterday.  History limited given current mental status change and information obtained per chart review and patient's daughter.    CBC and CMP unremarkable.  UA negative for UTI.  CT C-spine showed no acute process.  CT head showed no acute process.  Per nursing home record, patient did not take her medications today.  Patient found to be hypertensive and tachycardic in the ED. EKG showed sinus  tachycardia. ED provider spoke to tele neurology on-call who agreed to consult.  Patient received 500 cc bolus and was referred to Hospital Medicine.  Patient will be placed in observation for further evaluation and management.    Past Medical History:   Diagnosis Date    Atrial flutter     Hypertension     Impaired gait and mobility     Parkinson's disease        Past Surgical History:   Procedure Laterality Date    BACK SURGERY         Review of patient's allergies indicates:   Allergen Reactions    Gabapentin Other (See Comments)    Guaifenesin Other (See Comments)    Codeine Nausea And Vomiting, Hives and Other (See Comments)       No current facility-administered medications on file prior to encounter.     Current Outpatient Medications on File Prior to Encounter   Medication Sig    baclofen (LIORESAL) 10 MG tablet Take 1 tablet 4 times a day by oral route.    carbamide peroxide (DEBROX) 6.5 % otic solution daily prn    carbidopa-levodopa (PARCOPA)  mg per disintegrating tablet Place 2 tablets twice a day by translingual route.    cholecalciferol, vitamin D3, (VITAMIN D3) 125 mcg (5,000 unit) Tab     FLUoxetine 40 MG capsule Take 1 capsule every day by oral route.    lisinopriL 10 MG tablet Take 1 tablet by mouth once daily.    multivit with minerals/lutein (MULTIVITAMIN 50 PLUS ORAL)     oxyCODONE-acetaminophen (PERCOCET)  mg per tablet Take 1 tablet every 6 hours by oral route as needed.    potassium chloride (KLOR-CON) 20 mEq Pack Take 1 packet twice a day by oral route.     Family History    None       Tobacco Use    Smoking status: Unknown    Smokeless tobacco: Not on file   Substance and Sexual Activity    Alcohol use: Never    Drug use: Never    Sexual activity: Not on file     Review of Systems   Unable to perform ROS: Mental status change     Objective:     Vital Signs (Most Recent):  Temp: 97.7 °F (36.5 °C) (07/24/24 1714)  Pulse: (!) 125 (07/24/24 1728)  Resp: 18 (07/24/24 1714)  BP: (!)  140/73 (07/24/24 1714)  SpO2: 95 % (07/24/24 1714) Vital Signs (24h Range):  Temp:  [97.7 °F (36.5 °C)-98.9 °F (37.2 °C)] 97.7 °F (36.5 °C)  Pulse:  [101-140] 125  Resp:  [12-18] 18  SpO2:  [95 %-99 %] 95 %  BP: (140-218)/() 140/73     Weight: 32.8 kg (72 lb 5 oz)  Body mass index is 12.81 kg/m².     Physical Exam  Vitals reviewed.   Constitutional:       Appearance: She is underweight. She is ill-appearing (chronically).   HENT:      Head: Normocephalic.      Mouth/Throat:      Mouth: Mucous membranes are dry.      Pharynx: Oropharynx is clear.   Eyes:      General: Lids are normal. Gaze aligned appropriately.      Conjunctiva/sclera: Conjunctivae normal.   Cardiovascular:      Rate and Rhythm: Regular rhythm. Tachycardia present.      Pulses: Normal pulses.      Heart sounds: Normal heart sounds.   Pulmonary:      Effort: Pulmonary effort is normal.      Breath sounds: Normal breath sounds.   Abdominal:      General: Abdomen is flat. Bowel sounds are normal.      Palpations: Abdomen is soft.   Musculoskeletal:         General: Normal range of motion.      Cervical back: Normal range of motion.      Right lower leg: No edema.      Left lower leg: No edema.   Skin:     General: Skin is warm and dry.      Findings: Bruising (old bruising noted to lower extremities) present.   Neurological:      Mental Status: She is alert and oriented to person, place, and time. Mental status is at baseline.   Psychiatric:         Mood and Affect: Mood normal.                Significant Labs: All pertinent labs within the past 24 hours have been reviewed.  CBC:   Recent Labs   Lab 07/24/24  1341   WBC 5.23   HGB 11.4*   HCT 34.0*        CMP:   Recent Labs   Lab 07/24/24  1341      K 4.3      CO2 19*   *   BUN 28*   CREATININE 1.4   CALCIUM 9.4   PROT 6.1   ALBUMIN 3.6   BILITOT 0.5   ALKPHOS 58   AST 26   ALT 20   ANIONGAP 13       Significant Imaging: I have reviewed all pertinent imaging  results/findings within the past 24 hours.  Imaging Results              CT Cervical Spine Without Contrast (Final result)  Result time 07/24/24 14:15:39      Final result by Jesus Alberto Klein III, MD (07/24/24 14:15:39)                   Impression:      No acute process seen.    DJD and bilateral neural foraminal narrowing at C5-C6 and C6-C7.    Left thyroid nodule.      Electronically signed by: Jesus Alberto Klein MD  Date:    07/24/2024  Time:    14:15               Narrative:    EXAMINATION:  CT CERVICAL SPINE WITHOUT CONTRAST    CLINICAL HISTORY:  Neck trauma (Age >= 65y);    FINDINGS:  Odontoid, prevertebral soft tissues, and posterior elements are intact.  There is mild DJD.  The facets are well aligned.  No skull base fracture seen.  No C-spine fracture seen.  Upper lungs are clear.  There is DJD.  No incidental masses or adenopathy seen.  There is a left thyroid nodule.  At C5-C6 there is bilateral neural foraminal narrowing.    At C6-C7 there is bilateral neural foraminal narrowing.                                       CT Head Without Contrast (Final result)  Result time 07/24/24 14:11:04      Final result by Jesus Alberto Klein III, MD (07/24/24 14:11:04)                   Impression:      No acute process seen.      Electronically signed by: Jesus Alberto Klein MD  Date:    07/24/2024  Time:    14:11               Narrative:    EXAMINATION:  CT HEAD WITHOUT CONTRAST    CLINICAL HISTORY:  Head trauma, minor (Age >= 65y);    FINDINGS:  There is involutional change and small vessel ischemic change.  No bleed, mass, or mass effect seen.  No skull lesion or skull fracture seen.  Sinuses, mastoid air cells, and middle ear cavities are clear.                                      Assessment/Plan:     * Altered mental status  Patient sent from nursing home with decreased mental status.  Daughter states patient is alert and oriented x4 at baseline.  History of Parkinson's noted.  Per daughter, patient recently transferred  to [facility] from nursing home in [place].  Unable to locate records in chart review.  UA negative for UTI.  CT head showed no acute abnormality.  Per nursing, patient will answer questions intermittently.    -tele neuro consulted, recommendations appreciated  -currently holding baclofen and opioids  -consider MRI pending neuro recommendations        Primary hypertension  Chronic, controlled. Latest blood pressure and vitals reviewed-     Temp:  [97.7 °F (36.5 °C)-98.9 °F (37.2 °C)]   Pulse:  [101-140]   Resp:  [12-18]   BP: (140-218)/()   SpO2:  [95 %-99 %] .   Home meds for hypertension were reviewed and noted below.   Hypertension Medications               lisinopriL 10 MG tablet Take 1 tablet by mouth once daily.            While in the hospital, will manage blood pressure as follows; Continue home antihypertensive regimen    Will utilize p.r.n. blood pressure medication only if patient's blood pressure greater than 180/110 and she develops symptoms such as worsening chest pain or shortness of breath.    Recurrent major depressive disorder  History noted.  Currently taking fluoxetine daily    -continue fluoxetine 40 mg daily  -consider psychiatric consult given history of depression with recent move     Parkinson disease  History noted.  Patient has been followed by Neurology in the past, unable to locate recent records from [place].  Currently residing at [facility].  Patient not ambulatory at baseline per daughter    -continue carbidopa levodopa  -currently holding baclofen given altered mental status          VTE Risk Mitigation (From admission, onward)           Ordered     enoxaparin injection 30 mg  Daily         07/24/24 1542     IP VTE HIGH RISK PATIENT  Once         07/24/24 1542     Place sequential compression device  Until discontinued         07/24/24 1542                         On 07/24/2024, patient should be placed in hospital observation services        Martina Gage  NP  Department of Hospital Medicine  [facility] - Med Surg ([place])

## 2024-07-25 NOTE — ASSESSMENT & PLAN NOTE
Patient sent from nursing home with decreased mental status.  Daughter states patient is alert and oriented x4 at baseline.  History of Parkinson's noted.  Per daughter, patient recently transferred to [facility] from nursing home in [place].  Unable to locate records in chart review.  UA negative for UTI.  CT head showed no acute abnormality.  Per nursing, patient will answer questions intermittently.    -tele neuro consulted, recommendations appreciated  -currently holding baclofen and opioids  -consider MRI pending neuro recommendations

## 2024-07-25 NOTE — PROGRESS NOTES
[facility] Medicine  Progress Note    Patient Name: Tiffanie Naylor  MRN: [id number]  Patient Class: IP- Inpatient   Admission Date: 7/24/2024  Length of Stay: 0 days  Attending Physician: Nathan Snyder MD  Primary Care Provider: Licha, Primary Doctor        Subjective:     Principal Problem:Altered mental status        HPI:  Tiffanie Naylor is a 79 year old female with a past medical history of depression, Parkinson's, chronic pain, atrial flutter, osteoarthritis and hypertension who presents with decreased mental status.  Patient currently resides at [facility] nursing home and staff informed paramedics patient had an unwitnessed fall last night.  Nurse today was unable to state how long patient was down or exactly what occurred.  Per ED note, nursing home staff reports patient is alert and oriented at baseline, but was unresponsive today.  Daughter present at bedside, states patient was recently placed at [facility] after being transferred from another nursing home in [place].  Patient's daughter states she has exhibited similar symptoms in the past when she had a UTI.  Daughter also reports patient has had a decreased appetite since yesterday.  History limited given current mental status change and information obtained per chart review and patient's daughter.    CBC and CMP unremarkable.  UA negative for UTI.  CT C-spine showed no acute process.  CT head showed no acute process.  Per nursing home record, patient did not take her medications today.  Patient found to be hypertensive and tachycardic in the ED. EKG showed sinus tachycardia. ED provider spoke to tele neurology on-call who agreed to consult.  Patient received 500 cc bolus and was referred to Hospital Medicine.  Patient will be placed in observation for further evaluation and management.    Overview/Hospital Course:  No notes on file    Interval History: Pt with simple response this morning. She did follow simple commands  as well. Complained of back pain. Thorough discussion with daughter Smita. Patient has had thesed episodes in the past.     Review of Systems   Constitutional:  Positive for appetite change.   HENT:  Negative for trouble swallowing.    Eyes:  Negative for visual disturbance.   Respiratory:  Negative for shortness of breath.    Gastrointestinal:  Negative for constipation.   Genitourinary:  Negative for difficulty urinating.   Musculoskeletal:  Positive for back pain and myalgias.   Neurological:  Positive for weakness.   Psychiatric/Behavioral:  Positive for confusion and sleep disturbance.      Objective:     Vital Signs (Most Recent):  Temp: 97.9 °F (36.6 °C) (07/25/24 1618)  Pulse: 104 (07/25/24 1618)  Resp: 17 (07/25/24 1618)  BP: (!) 161/81 (07/25/24 1618)  SpO2: (!) 94 % (07/25/24 1618) Vital Signs (24h Range):  Temp:  [96.6 °F (35.9 °C)-98.8 °F (37.1 °C)] 97.9 °F (36.6 °C)  Pulse:  [] 104  Resp:  [12-18] 17  SpO2:  [94 %-98 %] 94 %  BP: (136-206)/() 161/81     Weight: 32.8 kg (72 lb 5 oz)  Body mass index is 12.81 kg/m².    Intake/Output Summary (Last 24 hours) at 7/25/2024 1713  Last data filed at 7/25/2024 0338  Gross per 24 hour   Intake 984.1 ml   Output 200 ml   Net 784.1 ml         Physical Exam  Vitals and nursing note reviewed.   Constitutional:       Comments: underweight   Eyes:      Extraocular Movements: Extraocular movements intact.      Pupils: Pupils are equal, round, and reactive to light.   Cardiovascular:      Rate and Rhythm: Normal rate.   Pulmonary:      Effort: Pulmonary effort is normal.   Abdominal:      General: Abdomen is flat. Bowel sounds are normal.      Tenderness: There is no abdominal tenderness.   Musculoskeletal:      Right lower leg: No edema.      Left lower leg: No edema.      Comments: BLLE and BL wrist contractures   Skin:     Findings: Bruising (BLLE) present.   Psychiatric:         Behavior: Behavior is withdrawn.             Significant Labs: All pertinent  labs within the past 24 hours have been reviewed.  BMP:   Recent Labs   Lab 07/25/24  0416   GLU 88      K 3.6   *   CO2 20*   BUN 26*   CREATININE 1.1   CALCIUM 8.9     CBC:   Recent Labs   Lab 07/24/24  1341 07/25/24  0416   WBC 5.23 3.97   HGB 11.4* 11.0*   HCT 34.0* 34.1*    150     CMP:   Recent Labs   Lab 07/24/24  1341 07/25/24  0416    143   K 4.3 3.6    113*   CO2 19* 20*   * 88   BUN 28* 26*   CREATININE 1.4 1.1   CALCIUM 9.4 8.9   PROT 6.1 5.3*   ALBUMIN 3.6 3.2*   BILITOT 0.5 0.5   ALKPHOS 58 51*   AST 26 26   ALT 20 22   ANIONGAP 13 10       Significant Imaging: I have reviewed all pertinent imaging results/findings within the past 24 hours.  MRI Brain Without Contrast  Narrative: EXAMINATION:  MRI BRAIN WITHOUT CONTRAST    CLINICAL HISTORY:  Parkinsonian syndrome;    FINDINGS:  Diffusion-weighted images demonstrate no evidence of acute ischemia or infarct.  Pituitary, midline structures, and craniocervical junction show nothing unusual.  Hippocampal formations and temporal lobes are symmetric.  GRE images demonstrate no significant blood products.  Skull and skull base demonstrate nothing unusual.  There are cataract implants.  Flow voids are present were expected.  There is involutional change and severe small vessel ischemic change.  Impression: Involutional change and small vessel ischemic change.    Electronically signed by: Jesus Alberto Klein MD  Date:    07/25/2024  Time:    15:13  X-Ray Lumbar Spine Ap And Lateral  Narrative: EXAMINATION:  XR LUMBAR SPINE AP AND LATERAL    CLINICAL HISTORY:  fall with back pain;    FINDINGS:  There are pedicle screws fixation rods between lower thoracic levels sacrum and SI joints/iliac wings.  There is a levoconvex curvature.  There is methylmethacrylate throughout lumbar levels and lower thoracic levels.  There is corpectomy at T12.  There is moderate DJD.  No hardware failure complication seen.  Impression: No acute process  seen, no complication seen, no hardware failure seen.    T12 compression fracture with corpectomy.    Multilevel postsurgical change.    Electronically signed by: Jesus Alberto Klein MD  Date:    07/25/2024  Time:    09:39        Assessment/Plan:      * Altered mental status  Patient sent from nursing home with decreased mental status.  Daughter states patient is alert and oriented x4 at baseline.  History of Parkinson's noted.  Per daughter, patient recently transferred to [facility] from nursing home in [place].  Unable to locate records in chart review.  UA negative for UTI.  CT head showed no acute abnormality.  Per nursing, patient will answer questions intermittently.    -tele neuro consulted, recommendations appreciated. Rec include: urine tox, eeg, mri brain  -appears like a behavioral disturbance related to dementia  -MRI brain with no acute abnormalities.         Primary hypertension  Chronic, controlled. Latest blood pressure and vitals reviewed-     Temp:  [96.6 °F (35.9 °C)-98.8 °F (37.1 °C)]   Pulse:  []   Resp:  [12-18]   BP: (136-206)/()   SpO2:  [94 %-98 %] .   Home meds for hypertension were reviewed and noted below.   Hypertension Medications               lisinopriL 10 MG tablet Take 1 tablet by mouth once daily.            While in the hospital, will manage blood pressure as follows; Continue home antihypertensive regimen    Will utilize p.r.n. blood pressure medication only if patient's blood pressure greater than 180/110 and she develops symptoms such as worsening chest pain or shortness of breath.    Recurrent major depressive disorder  History noted.  Currently taking fluoxetine daily    -continue fluoxetine 40 mg daily  -consider psychiatric consult given history of depression with recent move     Parkinson disease  History noted.  Patient has been followed by Neurology in the past, unable to locate recent records from [place].  Currently residing at [facility].  Patient  not ambulatory at baseline per daughter    -continue carbidopa levodopa            VTE Risk Mitigation (From admission, onward)           Ordered     enoxaparin injection 30 mg  Daily         07/24/24 1542     IP VTE HIGH RISK PATIENT  Once         07/24/24 1542     Place sequential compression device  Until discontinued         07/24/24 1542                    Discharge Planning   DEANNA: 7/26/2024     Code Status: DNR   Is the patient medically ready for discharge?:     Reason for patient still in hospital (select all that apply): Patient trending condition  Discharge Plan A: Return to nursing home                  Shelley Song DNP  Department of Hospital Medicine   [facility] ([place])

## 2024-07-25 NOTE — ASSESSMENT & PLAN NOTE
History noted.  Patient has been followed by Neurology in the past, unable to locate recent records from [place].  Currently residing at [facility].  Patient not ambulatory at baseline per daughter    -continue carbidopa levodopa  -currently holding baclofen given altered mental status

## 2024-07-25 NOTE — ASSESSMENT & PLAN NOTE
- Consult for advance care planning/ goals of care in chronically ill appearing patient with underlying Parkinson's disease who recently moved into a NH. Extensive chart review.  - Along with Simona Arreaga RN, visited with Ms. Naylor at the bedside. She is chronically ill appearing, cachectic with temporal lobe wasting. She was nonverbal, which is not her norm. At bedside, her daughter, Maggy, was present. Maggy expressed concern that this is not her mothers baseline. She reports at baseline Ms. Naylor is verbal and able to engage in conversation, but is not ambulatory. She is bed bound. Maggy reports that she spoke to her mother on Tuesday and noted her mentation was altered. Maggy expressed concern that Mrs. Naylor is currently not engaging in conversation and not following commands. Maggy further expressed concern about patients hydration. I let Maggy know I would reach out to Shelley Song regarding current status. Discussed with Maggy neuro saw patient and recommendation for MRI/ EEG.   - We reflected on Mrs. Naylor outside of the hospital. 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 She has one child, Maggy. She was diagnosed with Parkinson's years ago and was very active until 2 years ago. She enjoyed yoga and [place] chi. 2 years ago the patient had back surgery and has been unable to walk since then. She moved to [place] in a nursing home for 20 months to be closer to her sister. However, her daughter, Maggy decided to move Ms. Naylor back to [place] roughly 1 month ago and she now resides at [facility].   - I did contact Shelley Song who will follow up with patients daughter.   - We contacted the nurse at [facility].  She reports that when patient initially moved there patient was refusing baths/ hygiene needs, but 2 weeks later patient was cooperative. However, per chart review it appears patient was on psych meds in [place], but is not  currently. Patient was also receiving long term opioids in [place], but this has not been given at [facility]. [facility] RN also reports that patients mentation seems to fluctuate where she may respond and be cooperative at times, but other times is not. Per [facility], patient has a [place] on file designating her wishes of DNR, comfort focused measures, no artificial nutrition by tube.   - From speaking to Maggy, she would like to pursue workup for altered mental status to identify if this has reversible etiology, however she wants to avoid overly invasive measures.

## 2024-07-25 NOTE — SUBJECTIVE & OBJECTIVE
Interval History: Temporal lobe wasting, cachectic, does not engage in conversation     Past Medical History:   Diagnosis Date    Atrial flutter     Hypertension     Impaired gait and mobility     Parkinson's disease        Past Surgical History:   Procedure Laterality Date    BACK SURGERY         Review of patient's allergies indicates:   Allergen Reactions    Gabapentin Other (See Comments)    Guaifenesin Other (See Comments)    Codeine Nausea And Vomiting, Hives and Other (See Comments)       Medications:  Continuous Infusions:   0.9% NaCl   Intravenous Continuous 100 mL/hr at 07/25/24 1333 New Bag at 07/25/24 1333     Scheduled Meds:   carbidopa-levodopa  mg  2 tablet Oral BID    enoxparin  30 mg Subcutaneous Daily    FLUoxetine  40 mg Oral Daily    lisinopriL  10 mg Oral Daily    senna-docusate 8.6-50 mg  1 tablet Oral BID    vitamin D  5,000 Units Oral Daily     PRN Meds:  Current Facility-Administered Medications:     acetaminophen, 650 mg, Oral, Q8H PRN    HYDROmorphone, 0.5 mg, Intravenous, Q6H PRN    ketorolac, 9.999 mg, Intravenous, BID PRN    labetalol, 10 mg, Intravenous, Q8H PRN    melatonin, 6 mg, Oral, Nightly PRN    naloxone, 0.02 mg, Intravenous, PRN    ondansetron, 4 mg, Intravenous, Q6H PRN    sodium chloride 0.9%, 10 mL, Intravenous, Q8H PRN    Family History    None       Tobacco Use    Smoking status: Unknown    Smokeless tobacco: Not on file   Substance and Sexual Activity    Alcohol use: Never    Drug use: Never    Sexual activity: Not on file       Review of Systems   Unable to perform ROS: Mental status change     Objective:     Vital Signs (Most Recent):  Temp: 96.6 °F (35.9 °C) (07/25/24 1122)  Pulse: 100 (07/25/24 1327)  Resp: 17 (07/25/24 1122)  BP: (!) 179/105 (07/25/24 1327)  SpO2: 95 % (07/25/24 1122) Vital Signs (24h Range):  Temp:  [96.6 °F (35.9 °C)-98.8 °F (37.1 °C)] 96.6 °F (35.9 °C)  Pulse:  [] 100  Resp:  [12-18] 17  SpO2:  [94 %-99 %] 95 %  BP: (136-218)/()  179/105     Weight: 32.8 kg (72 lb 5 oz)  Body mass index is 12.81 kg/m².       Physical Exam  Constitutional:       Appearance: She is ill-appearing.      Comments: Temporal lobe wasting, cachectic   Contractures noted to UE    Cardiovascular:      Rate and Rhythm: Normal rate.   Pulmonary:      Effort: No respiratory distress.   Musculoskeletal:      Comments: LE contractures with bruising noted    Neurological:      Mental Status: She is disoriented.            Review of Symptoms      Symptom Assessment (ESAS 0-10 Scale)  Unable to complete assessment due to Mental status change         Pain Assessment in Advanced Demential Scale (PAINAD)   Breathing - Independent of vocalization:  0  Negative vocalization:  0  Facial expression:  0  Body language:  0  Consolability:  0  Total:  0    Living Arrangements:  Lives in nursing home    Psychosocial/Cultural:   See Palliative Psychosocial Note: Yes  - Recently moved to [facility] 3 weeks ago   - Has one daughter, Maggy   **Primary  to Follow**  Palliative Care  Consult: No        Advance Care Planning   Advance Directives:   Do Not Resuscitate Status: Yes      Decision Making:  Family answered questions  Goals of Care: The family endorses that what is most important right now is to focus on symptom/pain control and quality of life, even if it means sacrificing a little time    Accordingly, we have decided that the best plan to meet the patient's goals includes continuing with treatment         Significant Labs: All pertinent labs within the past 24 hours have been reviewed.  CBC:   Recent Labs   Lab 07/25/24 0416   WBC 3.97   HGB 11.0*   HCT 34.1*   MCV 96        BMP:  Recent Labs   Lab 07/25/24 0416   GLU 88      K 3.6   *   CO2 20*   BUN 26*   CREATININE 1.1   CALCIUM 8.9     LFT:  Lab Results   Component Value Date    AST 26 07/25/2024    ALKPHOS 51 (L) 07/25/2024    BILITOT 0.5 07/25/2024     Albumin:   Albumin  "  Date Value Ref Range Status   07/25/2024 3.2 (L) 3.5 - 5.2 g/dL Final     Protein:   Total Protein   Date Value Ref Range Status   07/25/2024 5.3 (L) 6.0 - 8.4 g/dL Final     Lactic acid:   No results found for: "LACTATE"    Significant Imaging: I have reviewed all pertinent imaging results/findings within the past 24 hours.    "

## 2024-07-25 NOTE — ASSESSMENT & PLAN NOTE
Chronic, controlled. Latest blood pressure and vitals reviewed-     Temp:  [97.7 °F (36.5 °C)-98.9 °F (37.2 °C)]   Pulse:  [101-140]   Resp:  [12-18]   BP: (140-218)/()   SpO2:  [95 %-99 %] .   Home meds for hypertension were reviewed and noted below.   Hypertension Medications               lisinopriL 10 MG tablet Take 1 tablet by mouth once daily.            While in the hospital, will manage blood pressure as follows; Continue home antihypertensive regimen    Will utilize p.r.n. blood pressure medication only if patient's blood pressure greater than 180/110 and she develops symptoms such as worsening chest pain or shortness of breath.

## 2024-07-25 NOTE — ASSESSMENT & PLAN NOTE
- Noted; patient has not established with Parkinson's doctor since moving to [place]   - Home meds: carbidopa levodopa   - At baseline, patient is non ambulatory due to back injury/ surgery 2 years ago

## 2024-07-25 NOTE — TELEMEDICINE CONSULT
"[facility]   General Neurology  Consult Note      Consult Information  Inpatient consult to Telemedicine-General Neurology  Consult performed by: Erik Salas MD  Consult ordered by: Martina Gage NP          Consulting Provider:    Current Providers  No providers found    Patient Location:  [facility] MEDICAL SURGICAL UNIT IP Unit    Spoke hospital nurse at bedside with patient assisting consultant.  Patient information was obtained from patient, past medical records, ER records, primary team, and daughter .       Neurology Documentation       Blood pressure (!) 167/97, pulse 89, temperature 97.6 °F (36.4 °C), temperature source Axillary, resp. rate 18, height 5' 3" (1.6 m), weight 32.8 kg (72 lb 5 oz), SpO2 (!) 94%.    Medical Decision Making  HPI:  79 y.o. female with a history of Parkinson's disease, T12 compression fracture who presents with altered mental status.  History obtained through chart review and over the phone with the daughter.    Has been bed-bound since a T12 compression fracture >2yrs ago.  Around a month ago, moved to [place] and stays at a nursing home.  Daughter states that since the last 2 days, she has been lethargic and not responding to her surroundings as she usually does.  Has not been eating or drinking nor has she been taking her medications.  Had a similar episode around 1.5 months ago which was attributed to a UTI.  Nursing home staff call the paramedics yesterday after an unwitnessed fall and unresponsiveness.  GCS was reportedly 8.  In the ER, her mental status improved.  No evidence of electrolyte derangements, leukocytosis or UTI on her labs.  Vitals were stable.  Received a 500 cc bolus of IVF and admitted for further workup.    She is currently on Sinemet for PD.    Percocet is listed on her home medication list.  She was getting this after her T12 compression fracture although the daughter is uncertain whether she still receives it at the nursing home.  At " "baseline, she is alert and oriented x3.    Images personally reviewed and interpreted:  Study: Head CT  Study Interpretation:  No acute pathology    Additional studies reviewed:   Study: Cardiac_Neuro: none  Study Interpretation: N/A    Laboratory studies reviewed:  BMP:   Lab Results   Component Value Date     07/25/2024    K 3.6 07/25/2024     (H) 07/25/2024    CO2 20 (L) 07/25/2024    BUN 26 (H) 07/25/2024    CREATININE 1.1 07/25/2024    CALCIUM 8.9 07/25/2024     CBC:   Lab Results   Component Value Date    WBC 3.97 07/25/2024    RBC 3.57 (L) 07/25/2024    HGB 11.0 (L) 07/25/2024    HCT 34.1 (L) 07/25/2024     07/25/2024    MCV 96 07/25/2024    MCH 30.8 07/25/2024    MCHC 32.3 07/25/2024     Lipid Panel: No results found for: "CHOL", "LDLCALC", "HDL", "TRIG"  Coagulation: No results found for: "PT", "INR", "APTT"  Hgb A1C: No results found for: "HGBA1C"  TSH: No results found for: "TSH"    Documentation personally reviewed:  Notes: Notes: ED notes and H&P     Assessment and plan:  78 y/o female with history as above who presents with altered mental status x2 days.  Upon speaking with the daughter, appears that her mentation has been fluctuating.  No history to suggest baseline dementia.  Labs did not show any evidence of an infection or electrolyte abnormalities.  Vitals were stable in the ER although her BP is elevated today, likely due to pain.    On exam, she was lethargic and requires repeated stimulation to respond.  Oriented to person, place, year and month.  Unaware why she was in the hospital and does not recall the events of the past few days.  Follows simple commands.  Severe bradykinesia and cogwheel rigidity.      Recommend MRI brain and routine EEG.  Check urine toxicology  Pain control per primary team.  Avoid excess opiate use    Delirium Precautions  -Re-orient patient frequently and keep pt's whiteboard up to date with current day and team member's names  -Keep shades " open/room lit during day  -Low light at night (close blinds at night)  -Low stimulation, minimize interruptions at night  -Minimize use of restraints  -Encourage family to be at bedside  -Regular bowel movements  -Avoid opiates, benzodiazepines, antihistamines, anticholinergics, hypnotics as much as possible      Post charge discharge plan:  Clinic follow up: Movement Disorders    Visit Type: in-person only  Timeframe: Next available        Additional Physical Exam, History, & ROS  ROS  Physical Exam  Past Medical History:   Diagnosis Date    Atrial flutter     Hypertension     Impaired gait and mobility     Parkinson's disease      Past Surgical History:   Procedure Laterality Date    BACK SURGERY       No family history on file.    Diagnoses  No problems updated.    Erik Salas MD    Neurology consultation requested by spoke provider. Audiovisual encounter with the patient performed using a secure connection.  Results and impressions from the visit are documented on this note and were communicated to the consulting provider/team via direct communication. The note has been shared for addition to the patients electronic medical record.

## 2024-07-25 NOTE — ASSESSMENT & PLAN NOTE
Patient sent from nursing home with decreased mental status.  Daughter states patient is alert and oriented x4 at baseline.  History of Parkinson's noted.  Per daughter, patient recently transferred to [facility] from nursing home in [place].  Unable to locate records in chart review.  UA negative for UTI.  CT head showed no acute abnormality.  Per nursing, patient will answer questions intermittently.    -tele neuro consulted, recommendations appreciated. Rec include: urine tox, eeg, mri brain  -appears like a behavioral disturbance related to dementia  -MRI brain with no acute abnormalities.

## 2024-07-25 NOTE — CONSULTS
[facility] ([place])  Palliative Medicine  Consult Note    Patient Name: Tiffanie Naylor  MRN: [id number]  Admission Date: 7/24/2024  Hospital Length of Stay: 0 days  Code Status: DNR   Attending Provider: Nathan Snyder MD  Consulting Provider: Ani Lopez DNP  Primary Care Physician: Licha, Primary Doctor  Principal Problem:Altered mental status    Patient information was obtained from patient, relative(s), nursing home, and primary team.      Inpatient consult to Palliative Care  Consult performed by: Ani Lopez DNP  Consult ordered by: Shelley Song DNP        Assessment/Plan:     Neuro  Parkinson disease  - Noted; patient has not established with Parkinson's doctor since moving to [place]   - Home meds: carbidopa levodopa   - At baseline, patient is non ambulatory due to back injury/ surgery 2 years ago     Psychiatric  Recurrent major depressive disorder  - Noted  - Home meds: fluoxetine 40 mg daily     Palliative Care  Encounter for palliative care  - Consult for advance care planning/ goals of care in chronically ill appearing patient with underlying Parkinson's disease who recently moved into a NH. Extensive chart review.  - Along with Simona Arreaga RN, visited with Ms. Naylor at the bedside. She is chronically ill appearing, cachectic with temporal lobe wasting. She was nonverbal, which is not her norm. At bedside, her daughter, Maggy, was present. Maggy expressed concern that this is not her mothers baseline. She reports at baseline Ms. Naylor is verbal and able to engage in conversation, but is not ambulatory. She is bed bound. Maggy reports that she spoke to her mother on Tuesday and noted her mentation was altered. Maggy expressed concern that Mrs. Naylor is currently not engaging in conversation and not following commands. Maggy further expressed concern about patients hydration. I let Maggy know I would reach out to Shelley Song regarding current status. Discussed  "with Maggy neuro saw patient and recommendation for MRI/ EEG.   - We reflected on Mrs. Naylor outside of the hospital. 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 She has one child, Maggy. She was diagnosed with Parkinson's years ago and was very active until 2 years ago. She enjoyed yoga and vahid chi. 2 years ago the patient had back surgery and has been unable to walk since then. She moved to [place] in a nursing home for 20 months to be closer to her sister. However, her daughter, Maggy decided to move Ms. Naylor back to [place] roughly 1 month ago and she now resides at [facility].   - I did contact Shelley Song who will follow up with patients daughter.   - We contacted the nurse at [facility].  She reports that when patient initially moved there patient was refusing baths/ hygiene needs, but 2 weeks later patient was cooperative. However, per chart review it appears patient was on psych meds in [place], but is not currently. Patient was also receiving long term opioids in [place], but this has not been given at [facility]. [facility] RN also reports that patients mentation seems to fluctuate where she may respond and be cooperative at times, but other times is not. Per [facility], patient has a LaPOST on file designating her wishes of DNR, comfort focused measures, no artificial nutrition by tube.   - From speaking to Maggy, she would like to pursue workup for altered mental status to identify if this has reversible etiology, however she wants to avoid overly invasive measures.         Thank you for your consult.     Subjective:     HPI:   Per H&P: "HPI: Tiffanie Naylor is a 79 year old female with a past medical history of depression, Parkinson's, chronic pain, atrial flutter, osteoarthritis and hypertension who presents with decreased mental status.  Patient currently resides at [facility] nursing home and staff informed paramedics " "patient had an unwitnessed fall last night.  Nurse today was unable to state how long patient was down or exactly what occurred.  Per ED note, nursing home staff reports patient is alert and oriented at baseline, but was unresponsive today.  Daughter present at bedside, states patient was recently placed at [facility] after being transferred from another nursing home in [place].  Patient's daughter states she has exhibited similar symptoms in the past when she had a UTI.  Daughter also reports patient has had a decreased appetite since yesterday.  History limited given current mental status change and information obtained per chart review and patient's daughter.     CBC and CMP unremarkable.  UA negative for UTI.  CT C-spine showed no acute process.  CT head showed no acute process.  Per nursing home record, patient did not take her medications today.  Patient found to be hypertensive and tachycardic in the ED. EKG showed sinus tachycardia. ED provider spoke to tele neurology on-call who agreed to consult.  Patient received 500 cc bolus and was referred to Hospital Medicine.  Patient will be placed in observation for further evaluation and management."    At time of initial consult, patient seen lying in bed, she is chronically ill appearing and does not engage in conversation. Palliative medicine consulted for goals of care/ advance care planning.     Hospital Course:  No notes on file    Interval History: Temporal lobe wasting, cachectic, does not engage in conversation     Past Medical History:   Diagnosis Date    Atrial flutter     Hypertension     Impaired gait and mobility     Parkinson's disease        Past Surgical History:   Procedure Laterality Date    BACK SURGERY         Review of patient's allergies indicates:   Allergen Reactions    Gabapentin Other (See Comments)    Guaifenesin Other (See Comments)    Codeine Nausea And Vomiting, Hives and Other (See Comments)       Medications:  Continuous " Infusions:   0.9% NaCl   Intravenous Continuous 100 mL/hr at 07/25/24 1333 New Bag at 07/25/24 1333     Scheduled Meds:   carbidopa-levodopa  mg  2 tablet Oral BID    enoxparin  30 mg Subcutaneous Daily    FLUoxetine  40 mg Oral Daily    lisinopriL  10 mg Oral Daily    senna-docusate 8.6-50 mg  1 tablet Oral BID    vitamin D  5,000 Units Oral Daily     PRN Meds:  Current Facility-Administered Medications:     acetaminophen, 650 mg, Oral, Q8H PRN    HYDROmorphone, 0.5 mg, Intravenous, Q6H PRN    ketorolac, 9.999 mg, Intravenous, BID PRN    labetalol, 10 mg, Intravenous, Q8H PRN    melatonin, 6 mg, Oral, Nightly PRN    naloxone, 0.02 mg, Intravenous, PRN    ondansetron, 4 mg, Intravenous, Q6H PRN    sodium chloride 0.9%, 10 mL, Intravenous, Q8H PRN    Family History    None       Tobacco Use    Smoking status: Unknown    Smokeless tobacco: Not on file   Substance and Sexual Activity    Alcohol use: Never    Drug use: Never    Sexual activity: Not on file       Review of Systems   Unable to perform ROS: Mental status change     Objective:     Vital Signs (Most Recent):  Temp: 96.6 °F (35.9 °C) (07/25/24 1122)  Pulse: 100 (07/25/24 1327)  Resp: 17 (07/25/24 1122)  BP: (!) 179/105 (07/25/24 1327)  SpO2: 95 % (07/25/24 1122) Vital Signs (24h Range):  Temp:  [96.6 °F (35.9 °C)-98.8 °F (37.1 °C)] 96.6 °F (35.9 °C)  Pulse:  [] 100  Resp:  [12-18] 17  SpO2:  [94 %-99 %] 95 %  BP: (136-218)/() 179/105     Weight: 32.8 kg (72 lb 5 oz)  Body mass index is 12.81 kg/m².       Physical Exam  Constitutional:       Appearance: She is ill-appearing.      Comments: Temporal lobe wasting, cachectic   Contractures noted to UE    Cardiovascular:      Rate and Rhythm: Normal rate.   Pulmonary:      Effort: No respiratory distress.   Musculoskeletal:      Comments: LE contractures with bruising noted    Neurological:      Mental Status: She is disoriented.            Review of Symptoms      Symptom Assessment (ESAS 0-10  "Scale)  Unable to complete assessment due to Mental status change         Pain Assessment in Advanced Demential Scale (PAINAD)   Breathing - Independent of vocalization:  0  Negative vocalization:  0  Facial expression:  0  Body language:  0  Consolability:  0  Total:  0    Living Arrangements:  Lives in nursing home    Psychosocial/Cultural:   See Palliative Psychosocial Note: Yes  - Recently moved to [facility] 3 weeks ago   - Has one daughter, Maggy   **Primary  to Follow**  Palliative Care  Consult: No        Advance Care Planning  Advance Directives:   Do Not Resuscitate Status: Yes      Decision Making:  Family answered questions  Goals of Care: The family endorses that what is most important right now is to focus on symptom/pain control and quality of life, even if it means sacrificing a little time    Accordingly, we have decided that the best plan to meet the patient's goals includes continuing with treatment         Significant Labs: All pertinent labs within the past 24 hours have been reviewed.  CBC:   Recent Labs   Lab 07/25/24 0416   WBC 3.97   HGB 11.0*   HCT 34.1*   MCV 96        BMP:  Recent Labs   Lab 07/25/24 0416   GLU 88      K 3.6   *   CO2 20*   BUN 26*   CREATININE 1.1   CALCIUM 8.9     LFT:  Lab Results   Component Value Date    AST 26 07/25/2024    ALKPHOS 51 (L) 07/25/2024    BILITOT 0.5 07/25/2024     Albumin:   Albumin   Date Value Ref Range Status   07/25/2024 3.2 (L) 3.5 - 5.2 g/dL Final     Protein:   Total Protein   Date Value Ref Range Status   07/25/2024 5.3 (L) 6.0 - 8.4 g/dL Final     Lactic acid:   No results found for: "LACTATE"    Significant Imaging: I have reviewed all pertinent imaging results/findings within the past 24 hours.    Discussed with Shelley Song    Total visit time: 86 minutes   > 50% of 70 min visit spent in chart review, face to face discussion of symptom assessment, coordination of care with other " specialists, and d/c planning  16 minutes ACP time spent: goals of care, emotional support, formulating and communication prognosis and goals of care, exploring burden/ benefit of various approaches of treatment.       Ani Lopez DNP  Palliative Medicine  [facility] ([place])

## 2024-07-25 NOTE — SUBJECTIVE & OBJECTIVE
Interval History: Pt with simple response this morning. She did follow simple commands as well. Complained of back pain. Thorough discussion with daughter Smita. Patient has had thesed episodes in the past.     Review of Systems   Constitutional:  Positive for appetite change.   HENT:  Negative for trouble swallowing.    Eyes:  Negative for visual disturbance.   Respiratory:  Negative for shortness of breath.    Gastrointestinal:  Negative for constipation.   Genitourinary:  Negative for difficulty urinating.   Musculoskeletal:  Positive for back pain and myalgias.   Neurological:  Positive for weakness.   Psychiatric/Behavioral:  Positive for confusion and sleep disturbance.      Objective:     Vital Signs (Most Recent):  Temp: 97.9 °F (36.6 °C) (07/25/24 1618)  Pulse: 104 (07/25/24 1618)  Resp: 17 (07/25/24 1618)  BP: (!) 161/81 (07/25/24 1618)  SpO2: (!) 94 % (07/25/24 1618) Vital Signs (24h Range):  Temp:  [96.6 °F (35.9 °C)-98.8 °F (37.1 °C)] 97.9 °F (36.6 °C)  Pulse:  [] 104  Resp:  [12-18] 17  SpO2:  [94 %-98 %] 94 %  BP: (136-206)/() 161/81     Weight: 32.8 kg (72 lb 5 oz)  Body mass index is 12.81 kg/m².    Intake/Output Summary (Last 24 hours) at 7/25/2024 1713  Last data filed at 7/25/2024 0338  Gross per 24 hour   Intake 984.1 ml   Output 200 ml   Net 784.1 ml         Physical Exam  Vitals and nursing note reviewed.   Constitutional:       Comments: underweight   Eyes:      Extraocular Movements: Extraocular movements intact.      Pupils: Pupils are equal, round, and reactive to light.   Cardiovascular:      Rate and Rhythm: Normal rate.   Pulmonary:      Effort: Pulmonary effort is normal.   Abdominal:      General: Abdomen is flat. Bowel sounds are normal.      Tenderness: There is no abdominal tenderness.   Musculoskeletal:      Right lower leg: No edema.      Left lower leg: No edema.      Comments: BLLE and BL wrist contractures   Skin:     Findings: Bruising (BLLE) present.    Psychiatric:         Behavior: Behavior is withdrawn.             Significant Labs: All pertinent labs within the past 24 hours have been reviewed.  BMP:   Recent Labs   Lab 07/25/24  0416   GLU 88      K 3.6   *   CO2 20*   BUN 26*   CREATININE 1.1   CALCIUM 8.9     CBC:   Recent Labs   Lab 07/24/24  1341 07/25/24  0416   WBC 5.23 3.97   HGB 11.4* 11.0*   HCT 34.0* 34.1*    150     CMP:   Recent Labs   Lab 07/24/24  1341 07/25/24  0416    143   K 4.3 3.6    113*   CO2 19* 20*   * 88   BUN 28* 26*   CREATININE 1.4 1.1   CALCIUM 9.4 8.9   PROT 6.1 5.3*   ALBUMIN 3.6 3.2*   BILITOT 0.5 0.5   ALKPHOS 58 51*   AST 26 26   ALT 20 22   ANIONGAP 13 10       Significant Imaging: I have reviewed all pertinent imaging results/findings within the past 24 hours.  MRI Brain Without Contrast  Narrative: EXAMINATION:  MRI BRAIN WITHOUT CONTRAST    CLINICAL HISTORY:  Parkinsonian syndrome;    FINDINGS:  Diffusion-weighted images demonstrate no evidence of acute ischemia or infarct.  Pituitary, midline structures, and craniocervical junction show nothing unusual.  Hippocampal formations and temporal lobes are symmetric.  GRE images demonstrate no significant blood products.  Skull and skull base demonstrate nothing unusual.  There are cataract implants.  Flow voids are present were expected.  There is involutional change and severe small vessel ischemic change.  Impression: Involutional change and small vessel ischemic change.    Electronically signed by: Jesus Alberto Klein MD  Date:    07/25/2024  Time:    15:13  X-Ray Lumbar Spine Ap And Lateral  Narrative: EXAMINATION:  XR LUMBAR SPINE AP AND LATERAL    CLINICAL HISTORY:  fall with back pain;    FINDINGS:  There are pedicle screws fixation rods between lower thoracic levels sacrum and SI joints/iliac wings.  There is a levoconvex curvature.  There is methylmethacrylate throughout lumbar levels and lower thoracic levels.  There is corpectomy at  T12.  There is moderate DJD.  No hardware failure complication seen.  Impression: No acute process seen, no complication seen, no hardware failure seen.    T12 compression fracture with corpectomy.    Multilevel postsurgical change.    Electronically signed by: Jesus Alberto Klein MD  Date:    07/25/2024  Time:    09:39

## 2024-07-25 NOTE — ASSESSMENT & PLAN NOTE
History noted.  Patient has been followed by Neurology in the past, unable to locate recent records from [place].  Currently residing at [facility].  Patient not ambulatory at baseline per daughter    -continue carbidopa levodopa

## 2024-07-25 NOTE — SUBJECTIVE & OBJECTIVE
"HPI:  79 y.o. female with a history of Parkinson's disease, T12 compression fracture who presents with altered mental status.  History obtained through chart review and over the phone with the daughter.    Has been bed-bound since a T12 compression fracture >2yrs ago.  Around a month ago, moved to [place] and stays at a nursing home.  Daughter states that since the last 2 days, she has been lethargic and not responding to her surroundings as she usually does.  Has not been eating or drinking nor has she been taking her medications.  Had a similar episode around 1.5 months ago which was attributed to a UTI.  Nursing home staff call the paramedics yesterday after an unwitnessed fall and unresponsiveness.  GCS was reportedly 8.  In the ER, her mental status improved.  No evidence of electrolyte derangements, leukocytosis or UTI on her labs.  Vitals were stable.  Received a 500 cc bolus of IVF and admitted for further workup.    She is currently on Sinemet for PD.    Percocet is listed on her home medication list.  She was getting this after her T12 compression fracture although the daughter is uncertain whether she still receives it at the nursing home.  At baseline, she is alert and oriented x3.    Images personally reviewed and interpreted:  Study: Head CT  Study Interpretation:  No acute pathology    Additional studies reviewed:   Study: Cardiac_Neuro: none  Study Interpretation: N/A    Laboratory studies reviewed:  BMP:   Lab Results   Component Value Date     07/25/2024    K 3.6 07/25/2024     (H) 07/25/2024    CO2 20 (L) 07/25/2024    BUN 26 (H) 07/25/2024    CREATININE 1.1 07/25/2024    CALCIUM 8.9 07/25/2024     CBC:   Lab Results   Component Value Date    WBC 3.97 07/25/2024    RBC 3.57 (L) 07/25/2024    HGB 11.0 (L) 07/25/2024    HCT 34.1 (L) 07/25/2024     07/25/2024    MCV 96 07/25/2024    MCH 30.8 07/25/2024    MCHC 32.3 07/25/2024     Lipid Panel: No results found for: "CHOL", " ""LDLCALC", "HDL", "TRIG"  Coagulation: No results found for: "PT", "INR", "APTT"  Hgb A1C: No results found for: "HGBA1C"  TSH: No results found for: "TSH"    Documentation personally reviewed:  Notes: Notes: ED notes and H&P     Assessment and plan:  78 y/o female with history as above who presents with altered mental status x2 days.  Upon speaking with the daughter, appears that her mentation has been fluctuating.  No history to suggest baseline dementia.  Labs did not show any evidence of an infection or electrolyte abnormalities.  Vitals were stable in the ER although her BP is elevated today, likely due to pain.    On exam, she was lethargic and requires repeated stimulation to respond.  Oriented to person, place, year and month.  Unaware why she was in the hospital and does not recall the events of the past few days.  Follows simple commands.  Severe bradykinesia and cogwheel rigidity.      Recommend MRI brain and routine EEG.  Check urine toxicology  Pain control per primary team.  Avoid excess opiate use    Delirium Precautions  -Re-orient patient frequently and keep pt's whiteboard up to date with current day and team member's names  -Keep shades open/room lit during day  -Low light at night (close blinds at night)  -Low stimulation, minimize interruptions at night  -Minimize use of restraints  -Encourage family to be at bedside  -Regular bowel movements  -Avoid opiates, benzodiazepines, antihistamines, anticholinergics, hypnotics as much as possible      Post charge discharge plan:  Clinic follow up: Movement Disorders    Visit Type: in-person only  Timeframe: Next available      "

## 2024-07-25 NOTE — ASSESSMENT & PLAN NOTE
History noted.  Currently taking fluoxetine daily    -continue fluoxetine 40 mg daily  -consider psychiatric consult given history of depression with recent move

## 2024-07-25 NOTE — PLAN OF CARE
Sw presented to bedside. Assessment was completed with daughter. Patient resides at [facility]. Phone number was confirmed. Preferred pharmacy is bedside. Patient will need transportation at [place].    07/25/24 1141   Discharge Assessment   Assessment Type Discharge Planning Assessment   Source of Information family;health record   If unable to respond/provide information was family/caregiver contacted? Yes   Facility Arrived From: [facility]   Do you expect to return to your current living situation? Yes   Do you have help at home or someone to help you manage your care at home? Yes   Prior to hospitilization cognitive status: Alert/Oriented;No Deficits   Current cognitive status: Unable to Assess   Equipment Currently Used at Home wheelchair   Readmission within 30 days? No   Patient currently being followed by outpatient case management? No   Do you currently have service(s) that help you manage your care at home? No   Do you take prescription medications? Yes   Do you have prescription coverage? Yes   Do you have any problems affording any of your prescribed medications? No   Is the patient taking medications as prescribed? yes   How do you get to doctors appointments? agency   Are you on dialysis? No   Do you take coumadin? No   Discharge Plan A Return to nursing home   Discharge Plan B Return to Nursing Home   Discharge Plan discussed with: Adult children   Transition of Care Barriers None   Physical Activity   On average, how many days per week do you engage in moderate to strenuous exercise (like a brisk walk)? 0 days   On average, how many minutes do you engage in exercise at this level? 0 min   Financial Resource Strain   How hard is it for you to pay for the very basics like food, housing, medical care, and heating? Not hard   Housing Stability   In the last 12 months, was there a time when you were not able to pay the mortgage or rent on time? N   At any time in the past 12  months, were you homeless or living in a shelter (including now)? N   Transportation Needs   Has the lack of transportation kept you from medical appointments, meetings, work or from getting things needed for daily living? No   Food Insecurity   Within the past 12 months, you worried that your food would run out before you got the money to buy more. Never true   Within the past 12 months, the food you bought just didn't last and you didn't have money to get more. Never true   Stress   Do you feel stress - tense, restless, nervous, or anxious, or unable to sleep at night because your mind is troubled all the time - these days? Pt Unable   Social Isolation   How often do you feel lonely or isolated from those around you?  Patient unable to answer   Alcohol Use   Q1: How often do you have a drink containing alcohol? Never   Q2: How many drinks containing alcohol do you have on a typical day when you are drinking? None   Q3: How often do you have six or more drinks on one occasion? Never   Utilities   In the past 12 months has the electric, gas, oil, or water company threatened to shut off services in your home? No   Health Literacy   How often do you need to have someone help you when you read instructions, pamphlets, or other written material from your doctor or pharmacy? Patient unable to respond

## 2024-07-26 VITALS
HEIGHT: 63 IN | TEMPERATURE: 98 F | DIASTOLIC BLOOD PRESSURE: 82 MMHG | OXYGEN SATURATION: 97 % | WEIGHT: 77.81 LBS | RESPIRATION RATE: 18 BRPM | HEART RATE: 108 BPM | SYSTOLIC BLOOD PRESSURE: 144 MMHG | BODY MASS INDEX: 13.79 KG/M2

## 2024-07-26 LAB
BASOPHILS # BLD AUTO: 0.02 K/UL (ref 0–0.2)
BASOPHILS NFR BLD: 0.4 % (ref 0–1.9)
DIFFERENTIAL METHOD BLD: ABNORMAL
EOSINOPHIL # BLD AUTO: 0.1 K/UL (ref 0–0.5)
EOSINOPHIL NFR BLD: 2.2 % (ref 0–8)
ERYTHROCYTE [DISTWIDTH] IN BLOOD BY AUTOMATED COUNT: 13.5 % (ref 11.5–14.5)
HCT VFR BLD AUTO: 33.1 % (ref 37–48.5)
HGB BLD-MCNC: 10.6 G/DL (ref 12–16)
IMM GRANULOCYTES # BLD AUTO: 0.01 K/UL (ref 0–0.04)
IMM GRANULOCYTES NFR BLD AUTO: 0.2 % (ref 0–0.5)
LYMPHOCYTES # BLD AUTO: 1.2 K/UL (ref 1–4.8)
LYMPHOCYTES NFR BLD: 23.4 % (ref 18–48)
MCH RBC QN AUTO: 31.3 PG (ref 27–31)
MCHC RBC AUTO-ENTMCNC: 32 G/DL (ref 32–36)
MCV RBC AUTO: 98 FL (ref 82–98)
MONOCYTES # BLD AUTO: 0.4 K/UL (ref 0.3–1)
MONOCYTES NFR BLD: 7.5 % (ref 4–15)
NEUTROPHILS # BLD AUTO: 3.3 K/UL (ref 1.8–7.7)
NEUTROPHILS NFR BLD: 66.3 % (ref 38–73)
NRBC BLD-RTO: 0 /100 WBC
PLATELET # BLD AUTO: 128 K/UL (ref 150–450)
PMV BLD AUTO: 9.3 FL (ref 9.2–12.9)
RBC # BLD AUTO: 3.39 M/UL (ref 4–5.4)
WBC # BLD AUTO: 4.92 K/UL (ref 3.9–12.7)

## 2024-07-26 PROCEDURE — 97535 SELF CARE MNGMENT TRAINING: CPT

## 2024-07-26 PROCEDURE — 95816 EEG AWAKE AND DROWSY: CPT

## 2024-07-26 PROCEDURE — 95816 EEG AWAKE AND DROWSY: CPT | Mod: 26,,, | Performed by: PSYCHIATRY & NEUROLOGY

## 2024-07-26 PROCEDURE — 36415 COLL VENOUS BLD VENIPUNCTURE: CPT | Performed by: NURSE PRACTITIONER

## 2024-07-26 PROCEDURE — 25000003 PHARM REV CODE 250: Performed by: NURSE PRACTITIONER

## 2024-07-26 PROCEDURE — 99233 SBSQ HOSP IP/OBS HIGH 50: CPT | Mod: ,,, | Performed by: FAMILY MEDICINE

## 2024-07-26 PROCEDURE — 85025 COMPLETE CBC W/AUTO DIFF WBC: CPT | Performed by: NURSE PRACTITIONER

## 2024-07-26 PROCEDURE — 97165 OT EVAL LOW COMPLEX 30 MIN: CPT

## 2024-07-26 PROCEDURE — 97161 PT EVAL LOW COMPLEX 20 MIN: CPT

## 2024-07-26 RX ORDER — ACETAMINOPHEN 325 MG/1
650 TABLET ORAL EVERY 8 HOURS PRN
Start: 2024-07-26

## 2024-07-26 RX ORDER — OXYCODONE AND ACETAMINOPHEN 5; 325 MG/1; MG/1
1 TABLET ORAL EVERY 6 HOURS PRN
Qty: 10 TABLET | Refills: 0 | Status: SHIPPED | OUTPATIENT
Start: 2024-07-26 | End: 2024-07-26

## 2024-07-26 RX ORDER — OXYCODONE AND ACETAMINOPHEN 10; 325 MG/1; MG/1
1 TABLET ORAL EVERY 12 HOURS
Qty: 14 TABLET | Refills: 0 | Status: SHIPPED | OUTPATIENT
Start: 2024-07-26 | End: 2024-07-26

## 2024-07-26 RX ORDER — OXYCODONE AND ACETAMINOPHEN 5; 325 MG/1; MG/1
1 TABLET ORAL EVERY 6 HOURS PRN
Qty: 10 TABLET | Refills: 0 | Status: SHIPPED | OUTPATIENT
Start: 2024-07-26 | End: 2024-07-31

## 2024-07-26 RX ORDER — OXYCODONE AND ACETAMINOPHEN 10; 325 MG/1; MG/1
1 TABLET ORAL EVERY 12 HOURS
Qty: 14 TABLET | Refills: 0 | Status: SHIPPED | OUTPATIENT
Start: 2024-07-26 | End: 2024-08-02

## 2024-07-26 RX ORDER — OXYCODONE AND ACETAMINOPHEN 10; 325 MG/1; MG/1
1 TABLET ORAL EVERY 8 HOURS
Status: DISCONTINUED | OUTPATIENT
Start: 2024-07-26 | End: 2024-07-26 | Stop reason: HOSPADM

## 2024-07-26 RX ADMIN — FLUOXETINE 40 MG: 20 CAPSULE ORAL at 09:07

## 2024-07-26 RX ADMIN — ACETAMINOPHEN 650 MG: 325 TABLET, FILM COATED ORAL at 09:07

## 2024-07-26 RX ADMIN — LISINOPRIL 10 MG: 10 TABLET ORAL at 09:07

## 2024-07-26 RX ADMIN — CARBIDOPA AND LEVODOPA 2 TABLET: 25; 100 TABLET ORAL at 09:07

## 2024-07-26 RX ADMIN — SODIUM CHLORIDE: 9 INJECTION, SOLUTION INTRAVENOUS at 12:07

## 2024-07-26 RX ADMIN — BACLOFEN 5 MG: 5 TABLET ORAL at 09:07

## 2024-07-26 RX ADMIN — Medication 5000 UNITS: at 09:07

## 2024-07-26 RX ADMIN — BACLOFEN 5 MG: 5 TABLET ORAL at 02:07

## 2024-07-26 NOTE — NURSING
Rapid Response Nurse Follow-up Note     Followed up with patient for proactive rounding.   No acute issues at this time. Reviewed plan of care with Bedside RN,   .   Team will continue to follow.  Please call Rapid Response RN, Zain Monreal RN with any questions or concerns at [phone].

## 2024-07-26 NOTE — ASSESSMENT & PLAN NOTE
"7/26/24  - Interval chart review performed.   - Discussed with Shelley Song. Apparently pain medication for chronic pain was not being given as [facility]. I would advise that Mrs. Naylor establish with pain management as this seems to have been an ongoing issue.  - Visited with patient at bedside today. She was lying in bed, chronically ill appearing with temporal lobe wasting. She was alert, awake, and conversational. She stated she is feeling better and denies being in pain, reports she recently received pain medication. She told me she recently moved to [place] and she is glad she did. She did reflect on how it has been an adjustment period at her new NH. She also discussed that she is looking forward to spending time with her 3 grandchildren. She spoke about her 3 grandchildren, what they do, and if they are dating. She also spoke about her daughter. She did state that she hopes to return to NH soon and "maybe I won't be so stubborn and that was a lesson learned". She reflected that she feels the adjustment to the new NH has been difficult but she is glad she is closer to family in [place]. Emotional support provided.  - I do think  receiving palliative care support at [facility] would be helpful, I am unsure if this is offered at the facility though.    7/25/24  - Consult for advance care planning/ goals of care in chronically ill appearing patient with underlying Parkinson's disease who recently moved into a NH. Extensive chart review.  - Along with Simona Arreaga RN, visited with Ms. Naylor at the bedside. She is chronically ill appearing, cachectic with temporal lobe wasting. She was nonverbal, which is not her norm. At bedside, her daughter, Maggy, was present. Maggy expressed concern that this is not her mothers baseline. She reports at baseline Ms. Naylor is verbal and able to engage in conversation, but is not ambulatory. She is bed bound. Maggy reports that she spoke to her " mother on Tuesday and noted her mentation was altered. Maggy expressed concern that Mrs. Naylor is currently not engaging in conversation and not following commands. Maggy further expressed concern about patients hydration. I let Maggy know I would reach out to Shelley Song regarding current status. Discussed with Maggy neuro saw patient and recommendation for MRI/ EEG.   - We reflected on Mrs. Naylor outside of the hospital. 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 She has one child, Maggy. She was diagnosed with Parkinson's years ago and was very active until 2 years ago. She enjoyed yoga and vahid chi. 2 years ago the patient had back surgery and has been unable to walk since then. She moved to [place] in a nursing home for 20 months to be closer to her sister. However, her daughter, Maggy decided to move Ms. Naylor back to [place] roughly 1 month ago and she now resides at [facility].   - I did contact Shelley Song who will follow up with patients daughter.   - We contacted the nurse at [facility].  She reports that when patient initially moved there patient was refusing baths/ hygiene needs, but 2 weeks later patient was cooperative. However, per chart review it appears patient was on psych meds in [place], but is not currently. Patient was also receiving long term opioids in [place], but this has not been given at [facility]. [facility] RN also reports that patients mentation seems to fluctuate where she may respond and be cooperative at times, but other times is not. Per [facility], patient has a LaPOST on file designating her wishes of DNR, comfort focused measures, no artificial nutrition by tube.   - From speaking to Maggy, she would like to pursue workup for altered mental status to identify if this has reversible etiology, however she wants to avoid overly invasive measures.

## 2024-07-26 NOTE — PLAN OF CARE
MOON Message    Medicare Outpatient and Observation Notification regarding financial responsibilityExplained to patient/caregiver; Other (comments)Medicare Outpatient and Observation Notification regarding financial responsibility. Explained to patient/caregiver; Other (comments). The comment is Patient unable to sign. Taken on 7/26/24 1212Date SNIDER was signed7/25/2024Time SNIDER was [id number]

## 2024-07-26 NOTE — PLAN OF CARE
Patient  is  AAOX2 to self/place, able to make her needs known. No sign of acute distress or discomfort noted. Safety measures maintained. Call light in reach , bed locked in lowest position. Patient condition stable  Problem: Adult Inpatient Plan of Care  Goal: Plan of Care Review  Outcome: Progressing  Goal: Patient-Specific Goal (Individualized)  Outcome: Progressing  Goal: Absence of Hospital-Acquired Illness or Injury  Outcome: Progressing  Goal: Optimal Comfort and Wellbeing  Outcome: Progressing  Goal: Readiness for Transition of Care  Outcome: Progressing     Problem: Infection  Goal: Absence of Infection Signs and Symptoms  Outcome: Progressing     Problem: Skin Injury Risk Increased  Goal: Skin Health and Integrity  Outcome: Progressing     Problem: Coping Ineffective  Goal: Effective Coping  Outcome: Progressing

## 2024-07-26 NOTE — HOSPITAL COURSE
Mrs Tiffanie Chaudhari was admitted for with altered mental status. A stroke and seizures were ruled out. No infectious etiology was found. Due to her history of parkinson's disease and vascular disease this is likely a behavioral episode of dementia. She has doen this in the past. Today she is lucid and full responsive. Her pain is better controlled. She will be discharge to [facility]. A referral to pain management was placed and an appointment with her neurologist at  was placed. Daughter Maggy present for discussions of diagnosis and treatments.

## 2024-07-26 NOTE — PROCEDURES
Date of service  07/26/2024    Introduction  Electroencephalographic (EEG) recording is performed with electrodes placed according to the International 10-20 placement system. Thirty two (32) channels of digital signal (sampling rate of 512/sec) including T1 and T2 was simultaneously recorded from the scalp and may include EKG, EMG, and/or eye monitors. Recording band pass was 0.1 to 512 Hz. Digital video recording of the patient is simultaneously recorded with the EEG. The patient is instructed to report clinical symptoms which may occur during the recording session. EEG and video recording is stored and archived in digital format. Activation procedures which include photic stimulation, hyperventilation and instructing patients to perform simple tasks are done in selected patients.    The EEG is displayed on a monitor screen and can be reviewed using different montages. Computer assisted analysis is employed to detect spike and electrographic seizure activity. The entire record is submitted for computer analysis. The entire recording is visually reviewed and, the times identified by computer analysis as being spikes or seizures are reviewed again.     Compressed spectral analysis (CSA) is also performed on the activity recorded from each individual channel. This is displayed as a power display of frequencies from 0 to 30 Hz over time. The CSA is reviewed looking for asymmetries in power between homologous areas of the scalp and then compared with the original EEG recording.     Findings  The patient's background consists of a posteriorly dominant 9 Hz alpha rhythm, which is well-formed, well-modulated, and abolishes with eye opening.     During the course of the recording, the patient is noted to be awake, and subsequently becomes drowsy.  Normal sleep architecture is not appreciated.    Hyperventilation and photic stimulation were not performed. Orientation questions were answered correctly.     There is no focal  slowing.  There are no focal, lateralized, or epileptiform transients.  No electrographic seizures are seen.    EKG demonstrates regular rhythm.    Interpretation  This is a normal EEG in an awake and drowsy adult. No potentially epileptiform activity was seen. Please be aware that a normal EEG does not exclude the possibility of an underlying seizure disorder.

## 2024-07-26 NOTE — PLAN OF CARE
MOON Message     Medicare Outpatient and Observation Notification regarding financial responsibility Explained to patient/caregiver; Other (comments)Medicare Outpatient and Observation Notification regarding financial responsibility. Explained to patient/caregiver; Other (comments). The comment is Patient unable to sign. Taken on 7/26/24 1212   Date SNIDER was signed 7/26/2024   Time SNIDER was signed 0915     Plan of Care Note

## 2024-07-26 NOTE — PLAN OF CARE
Medicare Message     Important Message from Medicare regarding Discharge Appeal Rights Explained to patient/caregiver; Other (comments)Important Message from Medicare regarding Discharge Appeal Rights. Explained to patient/caregiver; Other (comments). The comment is Patient unable to sign. Taken on 7/26/24 1212   Date IMM was signed 7/26/2024   Time IMM was signed 1217

## 2024-07-26 NOTE — PLAN OF CARE
Problem: Occupational Therapy  Goal: Occupational Therapy Goal  Description: Goals to be met by: 7/26/2024     Patient will increase functional independence with ADLs by performing:    Feeding with Stand-by Assistance.  GOAL MET 7/26/2024.  Grooming while EOB with Minimal Assistance.  GOAL MET 7/26/2024.  Sitting at edge of bed x10 minutes with Stand-by Assistance.  GOAL MET 7/26/2024.  Supine to sit with Maximum Assistance.  GOAL MET 7/26/2024.    Outcome: Met     Initial OT eval/treat complete.  Has access to facility provided DME/AE.  No DME needs anticipated.  Recommend post acute return to nursing home with continued OT/PT services.  No acute care OT needs identified.  Found to be at PLOF.  To discontinue OT services.  Please re-consult if needed.

## 2024-07-26 NOTE — ASSESSMENT & PLAN NOTE
Patient sent from nursing home with decreased mental status.  Daughter states patient is alert and oriented x4 at baseline.  History of Parkinson's noted.  Per daughter, patient recently transferred to [facility] from nursing home in [place].  Unable to locate records in chart review.  UA negative for UTI.  CT head showed no acute abnormality.  Per nursing, patient will answer questions intermittently.    -tele neuro consulted, recommendations appreciated. Rec include: urine tox, eeg, mri brain  -appears like a behavioral disturbance related to dementia  -MRI brain with no acute abnormalities.   -resolved

## 2024-07-26 NOTE — PT/OT/SLP EVAL
Occupational Therapy   Evaluation, Treatment, and Discharge Note    Name: Tiffanie Naylor  MRN: [id number]  Admitting Diagnosis: Altered mental status  Recent Surgery: * No surgery found *      Recommendations:     Discharge Recommendations:  (Recommend post acute return to nursing home with continued OT/PT services.)  Discharge Equipment Recommendations:  (None anticipated.)  Barriers to discharge:  None    Assessment:   Initial OT eval/treat complete.  Requiring MAX A to TOTAL A for bed mobility.  Requiring CGA and SBA for static sitting EOB alternating unilateral and 0 UE support.  Pt. Demos SBA for washing face and MOD A with HOHA for combing hair.  Self-feeding EOB using RUE-hand for task with SBA for safety and setup of apple sauce.  Pt. Educated on choking and aspiration risks with eating while R-side lying with HOB flat though reporting not wanting to eat with HOB elevated for fear of increased back pain.  Discussed EOB self-feeding with staff present with Pt. And daughter verbalizing understanding.  Has access to facility provided DME/AE.  No DME needs anticipated.  Recommend post acute return to nursing home with continued OT/PT services.  No acute care OT needs identified.  Found to be at PLOF.  To discontinue OT services.  Please re-consult if needed.      Tiffanie Naylor is a 79 y.o. female with a medical diagnosis of Altered mental status. At this time, patient is functioning at their prior level of function and does not require further acute OT services.     Plan:     During this hospitalization, patient does not require further acute OT services.  Please re-consult if situation changes.    Plan of Care Reviewed with: patient, daughter    Subjective     Chief Complaint: With c/o back pain.   Patient/Family Comments/goals:  No goals stated at this time.  Pt. Expressed that she does not want the HOB up d/t pain.     Occupational Profile:  Pt. Is a nursing home resident and reports receiving OT/PT services at  facility.  Staff assists with MAX A to TOTAL A transfers from bed<>W/C via squat-pivot technique.  Has bed baths and whirl pool baths at nursing home with staff assist.  Also receives assist for bathing/dressing/toileting at bed level though reports self-feeding EOB and performing grooming/hygiene at bed level.   Equipment Used at home:  (Facility provided DME)  Assistance upon Discharge: Facility staff able to assist.     Pain/Comfort:  Pain Rating 1: 4/10  Location 1: back  Pain Addressed 1: Distraction, Cessation of Activity, Nurse notified  Pain Rating Post-Intervention 1: 4/10    Patients cultural, spiritual, Moravian conflicts given the current situation:  (None stated.)    Objective:     Communicated with: Theresa AMEZCUA RN prior to session.  Patient found right sidelying with peripheral IV, PureWick, telemetry with daughter present upon OT entry to room.    General Precautions: Standard, fall  Orthopedic Precautions: N/A  Braces: N/A  Respiratory Status: Room air     Occupational Performance:    Bed Mobility:    Supine>sit with MAX A for BLE and trunk management; sit>side lying position with TOTAL A for BLE and trunk management.  MAX A for forward scoots X 2 via hip to hip lateral weight shift technique for forefoot support at floor.  Pt. Requiring increased cuing for task sequencing throughout.  SBA for static sitting EOB with unilateral UE support and CGA for safety with static sitting EOB without UE support.  TOTAL A for scooting towards HOB via draw sheet technique for optimal positioning.  Folded blanket placed between knees for pressure relief while side lying with patient and daughter verbalizing understanding.  Tolerating static sit EOB.      Activities of Daily Living:  Combing hair seated EOB with HOHA for 50% of task using RUE.  Washing face seated EOB with SBA for safety and retrieval of needed grooming items.  Self-feeding approx. 5 bites of applesauce while seated EOB with SBA for safety and setup  for opening apple sauce container.  Pt. Returned to bed to complete meal though refusing to have HOB elevated.  Educated on safety concerns with aspiration if eating with HOB flat; Pt. Verbalized understanding though continued refusal of attempting HOB elevation d/t Pt. Anticipating worsening back pain in this position.  Dicussed with Patient, daughter of patient, RN, and PCT that Pt. Could self feed while seated EOB with SBA with option to do this with nursing staff; all verbalized understanding.      Cognitive/Visual Perceptual:  Cognitive/Psychosocial Skills:  -       Oriented to: Person, Place, and Time; expressed not knowing reason for hospital admit   -       Follows Commands/attention:Follows one-step commands  -       Communication: able to make basic needs known and initiate conversation  -       Memory: Deficits noted  -       Safety awareness/insight to disability: impaired   -       Mood/Affect/Coping skills/emotional control: Flat affect    Physical Exam:  Postural examination/scapula alignment: -       Rounded shoulders  -       Forward head  -       Slight posterior trunk  Upper Extremity Range of Motion:  -       Right Upper Extremity: WFL  -       Left Upper Extremity: WFL  Upper Extremity Strength: -       Right Upper Extremity:  3+/5 gross  -       Left Upper Extremity:  3+/5 gross   Strength: -       Right Upper Extremity:  fair plus  -       Left Upper Extremity:  fair plus    AMPAC 6 Click ADL:  AMPAC Total Score: 9    Treatment & Education:  Educated on role of OT and POC.   Supine>sit with MAX A for BLE and trunk management; sit>side lying position with TOTAL A for BLE and trunk management.  MAX A for forward scoots X 2 via hip to hip lateral weight shift technique for forefoot support at floor.  Pt. Requiring increased cuing for task sequencing throughout.  SBA for static sitting EOB with unilateral UE support and CGA for safety with static sitting EOB without UE support.  TOTAL A for  scooting towards HOB via draw sheet technique for optimal positioning.  Folded blanket placed between knees for pressure relief while side lying with patient and daughter verbalizing understanding.  Tolerating static sit EOB.    Combing hair seated EOB with HOHA for 50% of task using RUE.  Washing face seated EOB with SBA for safety and retrieval of needed grooming items.  Self-feeding approx. 5 bites of applesauce while seated EOB with SBA for safety and setup for opening apple sauce container.  Pt. Returned to bed to complete meal though refusing to have HOB elevated.  Educated on safety concerns with aspiration if eating with HOB flat; Pt. Verbalized understanding though continued refusal of attempting HOB elevation d/t Pt. Anticipating worsening back pain in this position.  Dicussed with Patient, daughter of patient, RN, and PCT that Pt. Could self feed while seated EOB with SBA with option to do this with nursing staff; all verbalized understanding.    Received call light review and importance of calling for assist as needed.      Patient left right sidelying with all lines intact, call button in reach, nursing notified, and daughter present    GOALS:   Multidisciplinary Problems       Occupational Therapy Goals       Not on file              Multidisciplinary Problems (Resolved)          Problem: Occupational Therapy    Goal Priority Disciplines Outcome Interventions   Occupational Therapy Goal   (Resolved)     OT, PT/OT Met    Description: Goals to be met by: 7/26/2024     Patient will increase functional independence with ADLs by performing:    Feeding with Stand-by Assistance.  GOAL MET 7/26/2024.  Grooming while EOB with Minimal Assistance.  GOAL MET 7/26/2024.  Sitting at edge of bed x10 minutes with Stand-by Assistance.  GOAL MET 7/26/2024.  Supine to sit with Maximum Assistance.  GOAL MET 7/26/2024.                         History:     Past Medical History:   Diagnosis Date    Atrial flutter      Hypertension     Impaired gait and mobility     Parkinson's disease          Past Surgical History:   Procedure Laterality Date    BACK SURGERY         Time Tracking:     OT Date of Treatment: 07/26/24  OT Start Time: 1118  OT Stop Time: 1154  OT Total Time (min): 36 min    Billable Minutes:Evaluation 15  Self Care/Home Management 21    7/26/2024

## 2024-07-26 NOTE — PLAN OF CARE
NURSING HOME ORDERS    07/26/2024  [facility] LOCATION ([facility])  [facility] ([facility])  [address]  [facility] [address]  Dept: [phone]  Loc: [phone]     Admit to Nursing Home:  Nursing home    Diagnoses:  Active Hospital Problems    Diagnosis  POA    *Altered mental status [R41.82]  Yes     Priority: 1 - High    Encounter for palliative care [Z51.5]  Not Applicable    Parkinson disease [G20.A1]  Yes    Recurrent major depressive disorder [F33.9]  Yes    Primary hypertension [I10]  Yes      Resolved Hospital Problems   No resolved problems to display.       Patient is homebound due to:  Altered mental status    Allergies:  Review of patient's allergies indicates:   Allergen Reactions    Gabapentin Other (See Comments)    Guaifenesin Other (See Comments)    Codeine Nausea And Vomiting, Hives and Other (See Comments)       Vitals:  Routine    Diet: regular diet    Activities:   Activity as tolerated    Goals of Care Treatment Preferences:  Code Status: DNR          What is most important right now is to focus on symptom/pain control, quality of life, even if it means sacrificing a little time.  Accordingly, we have decided that the best plan to meet the patient's goals includes continuing with treatment.      Labs:  Per facility     Nursing Precautions:  Aspiration , Fall, and Pressure ulcer prevention    Consults:   none    Miscellaneous Care: Routine Skin for Bedridden Patients:  Apply moisture barrier cream to all                   Diabetes Care:  NA      Medications: Discontinue all previous medication orders, if any. See new list below.     Medication List        START taking these medications      acetaminophen 325 MG tablet  Commonly known as: TYLENOL  Take 2 tablets (650 mg total) by mouth every 8 (eight) hours as needed (Mild pain 1-3/10).            CHANGE how you take these medications      * oxyCODONE-acetaminophen  mg per tablet  Commonly known as: PERCOCET  Take 1 tablet  by mouth every 12 (twelve) hours. for 7 days  What changed: See the new instructions.     * oxyCODONE-acetaminophen 5-325 mg per tablet  Commonly known as: PERCOCET  Take 1 tablet by mouth every 6 (six) hours as needed for Pain (Severe pain 7-10/10).  What changed: You were already taking a medication with the same name, and this prescription was added. Make sure you understand how and when to take each.           * This list has 2 medication(s) that are the same as other medications prescribed for you. Read the directions carefully, and ask your doctor or other care provider to review them with you.                CONTINUE taking these medications      baclofen 10 MG tablet  Commonly known as: LIORESAL  Take 1 tablet 4 times a day by oral route.     carbidopa-levodopa  mg per disintegrating tablet  Commonly known as: PARCOPA  Place 2 tablets twice a day by translingual route.     DEBROX 6.5 % otic solution  Generic drug: carbamide peroxide  daily prn     FLUoxetine 40 MG capsule  Take 1 capsule every day by oral route.     lisinopriL 10 MG tablet  Take 1 tablet by mouth once daily.     MULTIVITAMIN 50 PLUS ORAL     potassium chloride 20 mEq Pack  Commonly known as: KLOR-CON  Take 1 packet twice a day by oral route.     VITAMIN D3 125 mcg (5,000 unit) Tab  Generic drug: cholecalciferol (vitamin D3)                Immunizations Administered as of 7/26/2024       Name Date Dose VIS Date Route Exp Date    COVID-19, MRNA, LN-S, PF (Pfizer) (Purple Cap) 2/5/2021 10:45 AM 0.3 mL 12/12/2020 Intramuscular 5/31/2021    Site: Right deltoid     Given By: Aracely Jessica RN     : [employer]     Lot: [id number]     COVID-19, MRNA, LN-S, PF (Pfizer) (Purple Cap) 1/15/2021 10:55 AM 0.3 mL 12/12/2020 Intramuscular 5/31/2021    Site: Right deltoid     Given By: Katerin Tirado RN     : [employer]     Lot: [id number]               _________________________________  Shelley Song DNP  07/26/2024

## 2024-07-26 NOTE — ASSESSMENT & PLAN NOTE
"- CT head did not show acute process  - MRI brain: "Involutional change and small vessel ischemic change."  "

## 2024-07-26 NOTE — DISCHARGE SUMMARY
[facility]  Discharge Summary      Patient Name: Tiffanie Naylor  MRN: [id number]  TITI: [id number]  Patient Class: IP- Inpatient  Admission Date: 7/24/2024  Hospital Length of Stay: 1 days  Discharge Date and Time:  07/26/2024 5:23 PM  Attending Physician: Licha att. providers found   Discharging Provider: Shelley Song DNP  Primary Care Provider: Licha Primary Doctor    Primary Care Team: Networked reference to record PCT     HPI:   Tiffanie Naylor is a 79 year old female with a past medical history of depression, Parkinson's, chronic pain, atrial flutter, osteoarthritis and hypertension who presents with decreased mental status.  Patient currently resides at [facility] nursing home and staff informed paramedics patient had an unwitnessed fall last night.  Nurse today was unable to state how long patient was down or exactly what occurred.  Per ED note, nursing home staff reports patient is alert and oriented at baseline, but was unresponsive today.  Daughter present at bedside, states patient was recently placed at [facility] after being transferred from another nursing home in [place].  Patient's daughter states she has exhibited similar symptoms in the past when she had a UTI.  Daughter also reports patient has had a decreased appetite since yesterday.  History limited given current mental status change and information obtained per chart review and patient's daughter.    CBC and CMP unremarkable.  UA negative for UTI.  CT C-spine showed no acute process.  CT head showed no acute process.  Per nursing home record, patient did not take her medications today.  Patient found to be hypertensive and tachycardic in the ED. EKG showed sinus tachycardia. ED provider spoke to tele neurology on-call who agreed to consult.  Patient received 500 cc bolus and was referred to Hospital Medicine.  Patient will be placed in observation for further evaluation and management.    * No surgery  found *      Hospital Course:   Mrs Tiffanie Chaudhari was admitted for with altered mental status. A stroke and seizures were ruled out. No infectious etiology was found. Due to her history of parkinson's disease and vascular disease this is likely a behavioral episode of dementia. She has doen this in the past. Today she is lucid and full responsive. Her pain is better controlled. She will be discharge to [facility]. A referral to pain management was placed and an appointment with her neurologist at  was placed. Daughter Maggy present for discussions of diagnosis and treatments.      Goals of Care Treatment Preferences:  Code Status: DNR          What is most important right now is to focus on symptom/pain control, quality of life, even if it means sacrificing a little time.  Accordingly, we have decided that the best plan to meet the patient's goals includes continuing with treatment.      Consults:   Consults (From admission, onward)          Status Ordering Provider     Inpatient consult to Palliative Care  Once        Provider:  (Not yet assigned)    Completed MODESTA CONKLIN     Inpatient consult to Telemedicine-General Neurology  Once        Provider:  (Not yet assigned)    Completed YEFRI GARVEY            Neuro  * Altered mental status  Patient sent from nursing [place] with decreased mental status.  Daughter states patient is alert and oriented x4 at baseline.  History of Parkinson's noted.  Per daughter, patient recently transferred to [facility] from nursing home in [place].  Unable to locate records in chart review.  UA negative for UTI.  CT head showed no acute abnormality.  Per nursing, patient will answer questions intermittently.    -tele neuro consulted, recommendations appreciated. Rec include: urine tox, eeg, mri brain  -appears like a behavioral disturbance related to dementia  -MRI brain with no acute abnormalities.   -resolved          Final Active Diagnoses:     Diagnosis Date Noted POA    PRINCIPAL PROBLEM:  Altered mental status [R41.82] 07/24/2024 Yes    Encounter for palliative care [Z51.5] 07/25/2024 Not Applicable    Parkinson disease [G20.A1] 07/24/2024 Yes    Recurrent major depressive disorder [F33.9] 07/24/2024 Yes    Primary hypertension [I10] 07/24/2024 Yes      Problems Resolved During this Admission:       Discharged Condition:  baseline    Disposition: Home or Self Care    Follow Up:   Follow-up Information       Todd Treviño MD Follow up on 9/23/2024.    Specialty: Neurology  Why: Follow up appointment at 10:20 AM. Please arrive at 10:00 AM.  Contact information:  [address] [place] [address]  [phone]                           Patient Instructions:      Ambulatory referral/consult to Neurology   Standing Status: Future   Referral Priority: Routine Referral Type: Consultation   Referral Reason: Specialty Services Required   Requested Specialty: Neurology   Number of Visits Requested: 1     Ambulatory referral/consult to Pain Clinic   Standing Status: Future   Referral Priority: Routine Referral Type: Consultation   Referral Reason: Specialty Services Required   Requested Specialty: Pain Medicine   Number of Visits Requested: 1     Diet Adult Regular     Notify your health care provider if you experience any of the following:  temperature >100.4     Notify your health care provider if you experience any of the following:  redness, tenderness, or signs of infection (pain, swelling, redness, odor or green/yellow discharge around incision site)     Notify your health care provider if you experience any of the following:  severe uncontrolled pain     Activity as tolerated       Significant Diagnostic Studies: N/A    Pending Diagnostic Studies:       None           Medications:  Reconciled Home Medications:      Medication List        START taking these medications      acetaminophen 325 MG tablet  Commonly known as: TYLENOL  Take 2  tablets (650 mg total) by mouth every 8 (eight) hours as needed (Mild pain 1-3/10).            CHANGE how you take these medications      * oxyCODONE-acetaminophen 5-325 mg per tablet  Commonly known as: PERCOCET  Take 1 tablet by mouth every 6 (six) hours as needed for Pain (as needed q6 hours for Severe pain 7-10/10).  What changed: You were already taking a medication with the same name, and this prescription was added. Make sure you understand how and when to take each.     * oxyCODONE-acetaminophen  mg per tablet  Commonly known as: PERCOCET  Take 1 tablet by mouth every 12 (twelve) hours. for 7 days  What changed: See the new instructions.           * This list has 2 medication(s) that are the same as other medications prescribed for you. Read the directions carefully, and ask your doctor or other care provider to review them with you.                CONTINUE taking these medications      baclofen 10 MG tablet  Commonly known as: LIORESAL  Take 1 tablet 4 times a day by oral route.     carbidopa-levodopa  mg per disintegrating tablet  Commonly known as: PARCOPA  Place 2 tablets twice a day by translingual route.     DEBROX 6.5 % otic solution  Generic drug: carbamide peroxide  daily prn     FLUoxetine 40 MG capsule  Take 1 capsule every day by oral route.     lisinopriL 10 MG tablet  Take 1 tablet by mouth once daily.     MULTIVITAMIN 50 PLUS ORAL     potassium chloride 20 mEq Pack  Commonly known as: KLOR-CON  Take 1 packet twice a day by oral route.     VITAMIN D3 125 mcg (5,000 unit) Tab  Generic drug: cholecalciferol (vitamin D3)              Indwelling Lines/Drains at time of discharge:   Lines/Drains/Airways       None                   Time spent on the discharge of patient: 36 minutes         Shelley Song DNP  Department of Hospital Medicine  [facility] ([place])

## 2024-07-26 NOTE — SUBJECTIVE & OBJECTIVE
Interval History: Awake, alert, conversational     Medications:  Continuous Infusions:   0.9% NaCl   Intravenous Continuous 100 mL/hr at 07/26/24 0050 New Bag at 07/26/24 0050     Scheduled Meds:   baclofen  5 mg Oral TID    carbidopa-levodopa  mg  2 tablet Oral BID    enoxparin  30 mg Subcutaneous Daily    FLUoxetine  40 mg Oral Daily    lisinopriL  10 mg Oral Daily    oxyCODONE-acetaminophen  1 tablet Oral Q8H    senna-docusate 8.6-50 mg  1 tablet Oral BID    vitamin D  5,000 Units Oral Daily     PRN Meds:  Current Facility-Administered Medications:     acetaminophen, 650 mg, Oral, Q8H PRN    ketorolac, 9.999 mg, Intravenous, BID PRN    labetalol, 10 mg, Intravenous, Q8H PRN    melatonin, 6 mg, Oral, Nightly PRN    naloxone, 0.02 mg, Intravenous, PRN    ondansetron, 4 mg, Intravenous, Q6H PRN    sodium chloride 0.9%, 10 mL, Intravenous, Q8H PRN    Objective:     Vital Signs (Most Recent):  Temp: 98.2 °F (36.8 °C) (07/26/24 0745)  Pulse: 103 (07/26/24 0745)  Resp: 18 (07/26/24 0745)  BP: (!) 173/86 (07/26/24 0745)  SpO2: (!) 94 % (07/26/24 0745) Vital Signs (24h Range):  Temp:  [96.6 °F (35.9 °C)-98.5 °F (36.9 °C)] 98.2 °F (36.8 °C)  Pulse:  [] 103  Resp:  [17-18] 18  SpO2:  [92 %-95 %] 94 %  BP: (131-179)/() 173/86     Weight: 35.3 kg (77 lb 13.2 oz)  Body mass index is 13.79 kg/m².       Physical Exam  Constitutional:       Appearance: She is ill-appearing.      Comments: Elderly, chronically ill appearing   Temporal lobe wasting present    Cardiovascular:      Rate and Rhythm: Normal rate.   Pulmonary:      Effort: No respiratory distress.   Musculoskeletal:      Comments: Contractures noted to UE and LE    Neurological:      Mental Status: She is alert and oriented to person, place, and time.      Comments: Alert, awake, conversational             Review of Symptoms      Symptom Assessment (ESAS 0-10 Scale)  Pain:  0  Fatigue:  2         Living Arrangements:  Lives in nursing  "home    Psychosocial/Cultural:   See Palliative Psychosocial Note: Yes  - Recently moved to [facility] 3 weeks ago   - Has one daughter, Maggy     **Primary  to Follow**  Palliative Care  Consult: No        Advance Care Planning   Advance Directives:   Do Not Resuscitate Status: Yes    Goals of Care: What is most important right now is to focus on symptom/pain control, quality of life, even if it means sacrificing a little time. Accordingly, we have decided that the best plan to meet the patient's goals includes continuing with treatment.         Significant Labs: All pertinent labs within the past 24 hours have been reviewed.  CBC:   Recent Labs   Lab 07/26/24  0556   WBC 4.92   HGB 10.6*   HCT 33.1*   MCV 98   *     BMP:  No results for input(s): "GLU", "NA", "K", "CL", "CO2", "BUN", "CREATININE", "CALCIUM", "MG" in the last 24 hours.  LFT:  Lab Results   Component Value Date    AST 26 07/25/2024    ALKPHOS 51 (L) 07/25/2024    BILITOT 0.5 07/25/2024     Albumin:   Albumin   Date Value Ref Range Status   07/25/2024 3.2 (L) 3.5 - 5.2 g/dL Final     Protein:   Total Protein   Date Value Ref Range Status   07/25/2024 5.3 (L) 6.0 - 8.4 g/dL Final     Lactic acid:   No results found for: "LACTATE"    Significant Imaging: I have reviewed all pertinent imaging results/findings within the past 24 hours.  "

## 2024-07-26 NOTE — ASSESSMENT & PLAN NOTE
- Noted; patient has not established with Parkinson's doctor since moving to [place]   - Home meds: carbidopa levodopa   - At baseline, patient is non ambulatory due to back injury/ surgery 2 years ago

## 2024-07-26 NOTE — PLAN OF CARE
Frederick met with patient and family at bedside. Frederick scheduled patient's follow up appointments. Frederick received room and report from [facility]. Frederick informed nurse. Frederick scheduled transportation for 2:30 PM.        07/26/24 5924   Final Note   Assessment Type Final Discharge Note   Anticipated Discharge Disposition [facility] Resources/Appts/Education Provided Provided patient/caregiver with written discharge plan information;Appointments scheduled and added to AVS   Post-Acute Status   Post-Acute Authorization Placement   Post-Acute Placement Status Set-up Complete/Auth obtained   Discharge Delays None known at this time

## 2024-07-26 NOTE — NURSING
"RAPID RESPONSE NURSE PROACTIVE ROUNDING NOTE     Time of Visit:     Admit Date: 2024  LOS: 1  Code Status: DNR   Date of Visit: 2024  : 1945  Age: 79 y.o.  Sex: female  Race: White  Bed: B324/B324 A:   MRN: [id number]  Was the patient discharged from an ICU this admission? No   Was the patient discharged from a PACU within last 24 hours?  No  Did the patient receive conscious sedation/general anesthesia in last 24 hours?  No  Was the patient in the ED within the past 24 hours?  No  Was the patient started on NIPPV within the past 24 hours?  No  Attending Physician: Nathan Snyder MD  Primary Service: Networked reference to record PCT     ASSESSMENT     Notified by Epic patient alert.  Reason for alert: MEWS Score    Diagnosis: Altered mental status    Abnormal Vital Signs: BP (!) 156/87 (BP Location: Left arm, Patient Position: Lying)   Pulse 105   Temp 97 °F (36.1 °C) (Oral)   Resp 18   Ht 5' 3" (1.6 m)   Wt 35.3 kg (77 lb 13.2 oz)   SpO2 (!) 93%   BMI 13.79 kg/m²      Clinical Issues: Neuro    Patient  has a past medical history of Atrial flutter, Hypertension, Impaired gait and mobility, and Parkinson's disease.      Pt resting comfortably in bed. States "I'm in a little pain and a little tired". Pain noted to R hip. Pain medication recently given. Family at bedside.      INTERVENTIONS/ RECOMMENDATIONS     No interventions at this time.     Discussed plan of care with LPN.    PHYSICIAN ESCALATION     Yes/No  No    Orders received and case discussed with NA.    Disposition: Remain in room 324.    FOLLOW-UP     Call back the Rapid Response Nurse, Nisha Flores RN at [facility] Phone:[phone] for additional questions or concerns.            "

## 2024-07-26 NOTE — PLAN OF CARE
D/C Rec: Return to NH with continued PT services  DME Rec: None    Pt evaluated and determined to be at PLOF for mobility. During this hospitalization, patient does not require further acute PT services. Please re-consult if situation changes.     Problem: Physical Therapy  Goal: Physical Therapy Goal  Description: No PT Goals identified   Outcome: Met

## 2024-07-26 NOTE — PT/OT/SLP EVAL
Physical Therapy Evaluation and Discharge Note    Patient Name:  Tiffanie Naylor   MRN:  [id number]    Recommendations:     Discharge Recommendations: Return to NH with continued PT services  Discharge Equipment Recommendations: none   Barriers to discharge: None    Assessment:     Tiffanie Naylor is a 79 y.o. female admitted with a medical diagnosis of Altered mental status. .  At this time, patient is functioning at their prior level of function and does not require further acute PT services.     Recent Surgery: * No surgery found *      Plan:     During this hospitalization, patient does not require further acute PT services.  Please re-consult if situation changes.      Subjective     Chief Complaint: Pt c/o pain along entire R side but reports pain medicine is helping  Patient/Family Comments/goals: Pt agreeable to PT eval   Pain/Comfort:  Pain Rating 1: 5/10  Location - Side 1: Right  Location 1: leg  Pain Addressed 1: Pre-medicate for activity, Cessation of Activity    Patients cultural, spiritual, Worship conflicts given the current situation: no    Living Environment:  Pt resides at [facility]  Prior to admission, patients level of function was non-ambulatory for 2 years following surgery for T12 fx and Parkinsons diagnosis. Pt requires Max-Total A for bed mobility and W/C transfers.  Equipment used at home: wheelchair, hospital bed.  DME owned (not currently used): none.  Upon discharge, patient will have assistance from NH staff.    Objective:     Communicated with RNs Theresa and Laura prior to session.  Patient found right sidelying with PureWick, peripheral IV, telemetry upon PT entry to room.    General Precautions: Standard, fall    Orthopedic Precautions:N/A   Braces: N/A  Respiratory Status: Room air    Exams:  Pt alert, oriented to self, place, month and year  B LE sensation grossly intact  ROM: Significant B hip and knee flexion contractures noted, fused R ankle, L foot drop   MMT: B hip and knee  2-/5 grossly, R ankle 1/5, L ankle 2-/5    Functional Mobility:  Pt t/f from R sidelying to sit at EOB Total A. Pt tolerated sitting at EOB x 2 minutes, CGA for sitting balance with pt holding onto bedrail with R UE for support. Pt returned to supine with Max A. Scoot to HOB Max A. Roll to R sidelying Mod A.     AM-PAC 6 CLICK MOBILITY  Total Score:10       Treatment and Education:  Pt educated on role of PT and POC    AM-PAC 6 CLICK MOBILITY  Total Score:10     Patient left right sidelying with all lines intact and call button in reach.    GOALS:   Multidisciplinary Problems       Physical Therapy Goals       Not on file              Multidisciplinary Problems (Resolved)          Problem: Physical Therapy    Goal Priority Disciplines Outcome Goal Variances Interventions   Physical Therapy Goal   (Resolved)     PT, PT/OT Met     Description: No PT Goals identified                        History:     Past Medical History:   Diagnosis Date    Atrial flutter     Hypertension     Impaired gait and mobility     Parkinson's disease        Past Surgical History:   Procedure Laterality Date    BACK SURGERY         Time Tracking:     PT Received On: 07/26/24  PT Start Time: 1024     PT Stop Time: 1034  PT Total Time (min): 10 min     Billable Minutes: Evaluation 9      07/26/2024

## 2024-07-26 NOTE — NURSING
Reached out to [facility] and gave report to nurse receiving patient.     Tele monitor and IV was removed. AVS printed and given to patient/family member at the bedside (Daughter). Awaiting transport to arrive. Pt has no complaints or needs at this time.

## 2024-07-26 NOTE — PLAN OF CARE
SW uploaded orders to [facility] and informed [facility]'s of patient dc.

## 2024-07-30 ENCOUNTER — TELEPHONE (OUTPATIENT)
Dept: ORTHOPEDICS | Facility: CLINIC | Age: 79
End: 2024-07-30
Payer: COMMERCIAL

## 2024-10-02 ENCOUNTER — TELEPHONE (OUTPATIENT)
Dept: ORTHOPEDICS | Facility: CLINIC | Age: 79
End: 2024-10-02
Payer: COMMERCIAL